# Patient Record
Sex: MALE | Race: WHITE | NOT HISPANIC OR LATINO | Employment: OTHER | URBAN - METROPOLITAN AREA
[De-identification: names, ages, dates, MRNs, and addresses within clinical notes are randomized per-mention and may not be internally consistent; named-entity substitution may affect disease eponyms.]

---

## 2017-03-27 ENCOUNTER — APPOINTMENT (EMERGENCY)
Dept: RADIOLOGY | Facility: HOSPITAL | Age: 37
End: 2017-03-27
Payer: COMMERCIAL

## 2017-03-27 ENCOUNTER — HOSPITAL ENCOUNTER (EMERGENCY)
Facility: HOSPITAL | Age: 37
Discharge: HOME | End: 2017-03-27
Attending: EMERGENCY MEDICINE | Admitting: EMERGENCY MEDICINE
Payer: COMMERCIAL

## 2017-03-27 VITALS
RESPIRATION RATE: 18 BRPM | SYSTOLIC BLOOD PRESSURE: 111 MMHG | WEIGHT: 260 LBS | HEART RATE: 67 BPM | TEMPERATURE: 98.2 F | OXYGEN SATURATION: 96 % | DIASTOLIC BLOOD PRESSURE: 64 MMHG

## 2017-03-27 DIAGNOSIS — M54.16 LUMBAR RADICULAR PAIN: Primary | ICD-10-CM

## 2017-03-27 LAB
ALBUMIN SERPL BCP-MCNC: 4.1 G/DL (ref 3.5–5)
ALP SERPL-CCNC: 77 U/L (ref 46–116)
ALT SERPL W P-5'-P-CCNC: 48 U/L (ref 12–78)
ANION GAP SERPL CALCULATED.3IONS-SCNC: 8 MMOL/L (ref 4–13)
AST SERPL W P-5'-P-CCNC: 26 U/L (ref 5–45)
BASOPHILS # BLD AUTO: 0.01 THOUSANDS/ΜL (ref 0–0.1)
BASOPHILS NFR BLD AUTO: 0 % (ref 0–1)
BILIRUB SERPL-MCNC: 0.5 MG/DL (ref 0.2–1)
BUN SERPL-MCNC: 23 MG/DL (ref 5–25)
CALCIUM SERPL-MCNC: 9.1 MG/DL (ref 8.3–10.1)
CHLORIDE SERPL-SCNC: 102 MMOL/L (ref 100–108)
CK MB SERPL-MCNC: 2 NG/ML (ref 0–5)
CK MB SERPL-MCNC: <1 % (ref 0–2.5)
CK SERPL-CCNC: 252 U/L (ref 39–308)
CLARITY, POC: CLEAR
CO2 SERPL-SCNC: 31 MMOL/L (ref 21–32)
COLOR, POC: NORMAL
CREAT SERPL-MCNC: 1.2 MG/DL (ref 0.6–1.3)
EOSINOPHIL # BLD AUTO: 0.08 THOUSAND/ΜL (ref 0–0.61)
EOSINOPHIL NFR BLD AUTO: 1 % (ref 0–6)
ERYTHROCYTE [DISTWIDTH] IN BLOOD BY AUTOMATED COUNT: 12.2 % (ref 11.6–15.1)
EXT BILIRUBIN, UA: NEGATIVE
EXT BLOOD URINE: NEGATIVE
EXT GLUCOSE, UA: NEGATIVE
EXT KETONES: NEGATIVE
EXT NITRITE, UA: NEGATIVE
EXT PH, UA: 6
EXT PROTEIN, UA: NORMAL
EXT SPECIFIC GRAVITY, UA: 1.02
EXT UROBILINOGEN: 0.2
GFR SERPL CREATININE-BSD FRML MDRD: >60 ML/MIN/1.73SQ M
GLUCOSE SERPL-MCNC: 101 MG/DL (ref 65–140)
HCT VFR BLD AUTO: 44.2 % (ref 36.5–49.3)
HGB BLD-MCNC: 15.1 G/DL (ref 12–17)
LYMPHOCYTES # BLD AUTO: 1.52 THOUSANDS/ΜL (ref 0.6–4.47)
LYMPHOCYTES NFR BLD AUTO: 26 % (ref 14–44)
MCH RBC QN AUTO: 29.7 PG (ref 26.8–34.3)
MCHC RBC AUTO-ENTMCNC: 34.2 G/DL (ref 31.4–37.4)
MCV RBC AUTO: 87 FL (ref 82–98)
MONOCYTES # BLD AUTO: 0.49 THOUSAND/ΜL (ref 0.17–1.22)
MONOCYTES NFR BLD AUTO: 8 % (ref 4–12)
NEUTROPHILS # BLD AUTO: 3.84 THOUSANDS/ΜL (ref 1.85–7.62)
NEUTS SEG NFR BLD AUTO: 65 % (ref 43–75)
PLATELET # BLD AUTO: 262 THOUSANDS/UL (ref 149–390)
PMV BLD AUTO: 11.1 FL (ref 8.9–12.7)
POTASSIUM SERPL-SCNC: 4.1 MMOL/L (ref 3.5–5.3)
PROT SERPL-MCNC: 7.9 G/DL (ref 6.4–8.2)
RBC # BLD AUTO: 5.08 MILLION/UL (ref 3.88–5.62)
SODIUM SERPL-SCNC: 141 MMOL/L (ref 136–145)
WBC # BLD AUTO: 5.94 THOUSAND/UL (ref 4.31–10.16)
WBC # BLD EST: NEGATIVE 10*3/UL

## 2017-03-27 PROCEDURE — 96361 HYDRATE IV INFUSION ADD-ON: CPT

## 2017-03-27 PROCEDURE — 99284 EMERGENCY DEPT VISIT MOD MDM: CPT

## 2017-03-27 PROCEDURE — 85025 COMPLETE CBC W/AUTO DIFF WBC: CPT | Performed by: PHYSICIAN ASSISTANT

## 2017-03-27 PROCEDURE — 80053 COMPREHEN METABOLIC PANEL: CPT | Performed by: PHYSICIAN ASSISTANT

## 2017-03-27 PROCEDURE — 96374 THER/PROPH/DIAG INJ IV PUSH: CPT

## 2017-03-27 PROCEDURE — 36415 COLL VENOUS BLD VENIPUNCTURE: CPT | Performed by: PHYSICIAN ASSISTANT

## 2017-03-27 PROCEDURE — 82553 CREATINE MB FRACTION: CPT | Performed by: PHYSICIAN ASSISTANT

## 2017-03-27 PROCEDURE — 96375 TX/PRO/DX INJ NEW DRUG ADDON: CPT

## 2017-03-27 PROCEDURE — 73552 X-RAY EXAM OF FEMUR 2/>: CPT

## 2017-03-27 PROCEDURE — 81002 URINALYSIS NONAUTO W/O SCOPE: CPT | Performed by: PHYSICIAN ASSISTANT

## 2017-03-27 PROCEDURE — 82550 ASSAY OF CK (CPK): CPT | Performed by: PHYSICIAN ASSISTANT

## 2017-03-27 PROCEDURE — 73502 X-RAY EXAM HIP UNI 2-3 VIEWS: CPT

## 2017-03-27 RX ORDER — OXYCODONE HYDROCHLORIDE AND ACETAMINOPHEN 5; 325 MG/1; MG/1
2 TABLET ORAL ONCE
Status: COMPLETED | OUTPATIENT
Start: 2017-03-27 | End: 2017-03-27

## 2017-03-27 RX ORDER — PREDNISONE 20 MG/1
40 TABLET ORAL DAILY
Qty: 8 TABLET | Refills: 0 | Status: SHIPPED | OUTPATIENT
Start: 2017-03-27 | End: 2017-04-26

## 2017-03-27 RX ORDER — CYCLOBENZAPRINE HCL 5 MG
5 TABLET ORAL 3 TIMES DAILY PRN
Qty: 12 TABLET | Refills: 0 | Status: SHIPPED | OUTPATIENT
Start: 2017-03-27 | End: 2018-12-17

## 2017-03-27 RX ORDER — OXYCODONE HYDROCHLORIDE AND ACETAMINOPHEN 5; 325 MG/1; MG/1
1 TABLET ORAL EVERY 6 HOURS PRN
Qty: 12 TABLET | Refills: 0 | Status: SHIPPED | OUTPATIENT
Start: 2017-03-27 | End: 2017-04-06

## 2017-03-27 RX ORDER — DIAZEPAM 5 MG/ML
5 INJECTION, SOLUTION INTRAMUSCULAR; INTRAVENOUS ONCE
Status: COMPLETED | OUTPATIENT
Start: 2017-03-27 | End: 2017-03-27

## 2017-03-27 RX ORDER — KETOROLAC TROMETHAMINE 30 MG/ML
30 INJECTION, SOLUTION INTRAMUSCULAR; INTRAVENOUS ONCE
Status: COMPLETED | OUTPATIENT
Start: 2017-03-27 | End: 2017-03-27

## 2017-03-27 RX ORDER — PREDNISONE 20 MG/1
60 TABLET ORAL ONCE
Status: COMPLETED | OUTPATIENT
Start: 2017-03-27 | End: 2017-03-27

## 2017-03-27 RX ADMIN — PREDNISONE 60 MG: 20 TABLET ORAL at 10:57

## 2017-03-27 RX ADMIN — DIAZEPAM 5 MG: 5 INJECTION, SOLUTION INTRAMUSCULAR; INTRAVENOUS at 08:53

## 2017-03-27 RX ADMIN — SODIUM CHLORIDE 1000 ML: 0.9 INJECTION, SOLUTION INTRAVENOUS at 08:54

## 2017-03-27 RX ADMIN — OXYCODONE HYDROCHLORIDE AND ACETAMINOPHEN 2 TABLET: 5; 325 TABLET ORAL at 10:57

## 2017-03-27 RX ADMIN — KETOROLAC TROMETHAMINE 30 MG: 30 INJECTION, SOLUTION INTRAMUSCULAR at 08:54

## 2017-10-04 ENCOUNTER — TRANSCRIBE ORDERS (OUTPATIENT)
Dept: ADMINISTRATIVE | Facility: HOSPITAL | Age: 37
End: 2017-10-04

## 2017-10-04 ENCOUNTER — HOSPITAL ENCOUNTER (OUTPATIENT)
Dept: RADIOLOGY | Facility: HOSPITAL | Age: 37
Discharge: HOME | End: 2017-10-04
Attending: PODIATRIST
Payer: COMMERCIAL

## 2017-10-04 DIAGNOSIS — M79.672 LEFT FOOT PAIN: ICD-10-CM

## 2017-10-04 DIAGNOSIS — M72.2 PLANTAR FASCIITIS: ICD-10-CM

## 2017-10-04 DIAGNOSIS — M72.2 PLANTAR FASCIITIS: Primary | ICD-10-CM

## 2017-10-04 DIAGNOSIS — M79.671 RIGHT FOOT PAIN: ICD-10-CM

## 2017-10-04 PROCEDURE — 73630 X-RAY EXAM OF FOOT: CPT

## 2017-10-04 PROCEDURE — 73610 X-RAY EXAM OF ANKLE: CPT

## 2018-11-20 ENCOUNTER — DOCUMENTATION (OUTPATIENT)
Dept: OTHER | Facility: HOSPITAL | Age: 38
End: 2018-11-20

## 2018-11-20 DIAGNOSIS — M72.2 PLANTAR FASCIAL FIBROMATOSIS: ICD-10-CM

## 2018-11-20 DIAGNOSIS — M76.822 POSTERIOR TIBIAL TENDONITIS, LEFT: ICD-10-CM

## 2018-11-20 DIAGNOSIS — S90.32XA CONTUSION OF LEFT FOOT, INITIAL ENCOUNTER: Primary | ICD-10-CM

## 2018-11-29 ENCOUNTER — HOSPITAL ENCOUNTER (OUTPATIENT)
Dept: RADIOLOGY | Age: 38
Discharge: HOME | End: 2018-11-29
Payer: COMMERCIAL

## 2018-11-29 DIAGNOSIS — M76.822 POSTERIOR TIBIAL TENDONITIS, LEFT: ICD-10-CM

## 2018-11-29 DIAGNOSIS — S90.32XA CONTUSION OF LEFT FOOT, INITIAL ENCOUNTER: ICD-10-CM

## 2018-11-29 DIAGNOSIS — M72.2 PLANTAR FASCIAL FIBROMATOSIS: ICD-10-CM

## 2018-11-29 PROCEDURE — 73721 MRI JNT OF LWR EXTRE W/O DYE: CPT

## 2018-12-17 RX ORDER — IBUPROFEN 600 MG/1
TABLET ORAL EVERY 6 HOURS PRN
COMMUNITY

## 2018-12-17 NOTE — PRE-PROCEDURE INSTRUCTIONS
Pre-Surgery Instructions:   Medication Instructions    ibuprofen (MOTRIN) 600 mg tablet Instructed patient per Anesthesia Guidelines  Per anesthesia patient/wife were instructed to stop NSAIDS and supplements one week preop unless surgeon gives other instructions  No medications are needed on day of surgery  Instructed on use of Chlorhexidine for preoperative bathing

## 2018-12-24 ENCOUNTER — ANESTHESIA EVENT (OUTPATIENT)
Dept: PERIOP | Facility: HOSPITAL | Age: 38
End: 2018-12-24
Payer: COMMERCIAL

## 2018-12-26 ENCOUNTER — HOSPITAL ENCOUNTER (OUTPATIENT)
Facility: HOSPITAL | Age: 38
Setting detail: OUTPATIENT SURGERY
Discharge: HOME | End: 2018-12-26
Attending: PODIATRIST | Admitting: PODIATRIST
Payer: COMMERCIAL

## 2018-12-26 ENCOUNTER — ANESTHESIA (OUTPATIENT)
Dept: PERIOP | Facility: HOSPITAL | Age: 38
End: 2018-12-26
Payer: COMMERCIAL

## 2018-12-26 VITALS
RESPIRATION RATE: 16 BRPM | HEART RATE: 72 BPM | WEIGHT: 260 LBS | TEMPERATURE: 97.6 F | OXYGEN SATURATION: 96 % | HEIGHT: 74 IN | BODY MASS INDEX: 33.37 KG/M2 | DIASTOLIC BLOOD PRESSURE: 62 MMHG | SYSTOLIC BLOOD PRESSURE: 125 MMHG

## 2018-12-26 DIAGNOSIS — Z47.89 SURGICAL AFTERCARE, MUSCULOSKELETAL SYSTEM: Primary | ICD-10-CM

## 2018-12-26 RX ORDER — OXYCODONE HYDROCHLORIDE AND ACETAMINOPHEN 5; 325 MG/1; MG/1
1 TABLET ORAL EVERY 4 HOURS PRN
Status: DISCONTINUED | OUTPATIENT
Start: 2018-12-26 | End: 2018-12-26 | Stop reason: HOSPADM

## 2018-12-26 RX ORDER — FENTANYL CITRATE 50 UG/ML
50 INJECTION, SOLUTION INTRAMUSCULAR; INTRAVENOUS
Status: DISCONTINUED | OUTPATIENT
Start: 2018-12-26 | End: 2018-12-26 | Stop reason: HOSPADM

## 2018-12-26 RX ORDER — CEFAZOLIN SODIUM 2 G/50ML
2000 SOLUTION INTRAVENOUS
Status: DISCONTINUED | OUTPATIENT
Start: 2018-12-26 | End: 2018-12-26 | Stop reason: HOSPADM

## 2018-12-26 RX ORDER — SODIUM CHLORIDE 9 MG/ML
125 INJECTION, SOLUTION INTRAVENOUS CONTINUOUS
Status: DISCONTINUED | OUTPATIENT
Start: 2018-12-26 | End: 2018-12-26 | Stop reason: HOSPADM

## 2018-12-26 RX ORDER — MIDAZOLAM HYDROCHLORIDE 1 MG/ML
INJECTION INTRAMUSCULAR; INTRAVENOUS AS NEEDED
Status: DISCONTINUED | OUTPATIENT
Start: 2018-12-26 | End: 2018-12-26 | Stop reason: SURG

## 2018-12-26 RX ORDER — ONDANSETRON 2 MG/ML
INJECTION INTRAMUSCULAR; INTRAVENOUS AS NEEDED
Status: DISCONTINUED | OUTPATIENT
Start: 2018-12-26 | End: 2018-12-26 | Stop reason: SURG

## 2018-12-26 RX ORDER — OXYCODONE HYDROCHLORIDE AND ACETAMINOPHEN 5; 325 MG/1; MG/1
1 TABLET ORAL EVERY 4 HOURS PRN
Qty: 15 TABLET | Refills: 0 | Status: SHIPPED | OUTPATIENT
Start: 2018-12-26 | End: 2019-01-05

## 2018-12-26 RX ORDER — PROPOFOL 10 MG/ML
INJECTION, EMULSION INTRAVENOUS AS NEEDED
Status: DISCONTINUED | OUTPATIENT
Start: 2018-12-26 | End: 2018-12-26 | Stop reason: SURG

## 2018-12-26 RX ORDER — BETAMETHASONE SODIUM PHOSPHATE AND BETAMETHASONE ACETATE 3; 3 MG/ML; MG/ML
INJECTION, SUSPENSION INTRA-ARTICULAR; INTRALESIONAL; INTRAMUSCULAR; SOFT TISSUE AS NEEDED
Status: DISCONTINUED | OUTPATIENT
Start: 2018-12-26 | End: 2018-12-26 | Stop reason: HOSPADM

## 2018-12-26 RX ORDER — LIDOCAINE HYDROCHLORIDE 10 MG/ML
INJECTION, SOLUTION INFILTRATION; PERINEURAL AS NEEDED
Status: DISCONTINUED | OUTPATIENT
Start: 2018-12-26 | End: 2018-12-26 | Stop reason: SURG

## 2018-12-26 RX ORDER — ACETAMINOPHEN 500 MG
1000 TABLET ORAL EVERY 6 HOURS PRN
COMMUNITY

## 2018-12-26 RX ORDER — ONDANSETRON 2 MG/ML
4 INJECTION INTRAMUSCULAR; INTRAVENOUS EVERY 6 HOURS PRN
Status: DISCONTINUED | OUTPATIENT
Start: 2018-12-26 | End: 2018-12-26 | Stop reason: HOSPADM

## 2018-12-26 RX ORDER — MAGNESIUM HYDROXIDE 1200 MG/15ML
LIQUID ORAL AS NEEDED
Status: DISCONTINUED | OUTPATIENT
Start: 2018-12-26 | End: 2018-12-26 | Stop reason: HOSPADM

## 2018-12-26 RX ORDER — KETOROLAC TROMETHAMINE 30 MG/ML
INJECTION, SOLUTION INTRAMUSCULAR; INTRAVENOUS AS NEEDED
Status: DISCONTINUED | OUTPATIENT
Start: 2018-12-26 | End: 2018-12-26 | Stop reason: SURG

## 2018-12-26 RX ORDER — FENTANYL CITRATE 50 UG/ML
INJECTION, SOLUTION INTRAMUSCULAR; INTRAVENOUS AS NEEDED
Status: DISCONTINUED | OUTPATIENT
Start: 2018-12-26 | End: 2018-12-26 | Stop reason: SURG

## 2018-12-26 RX ADMIN — CEFAZOLIN SODIUM 2000 MG: 2 SOLUTION INTRAVENOUS at 08:44

## 2018-12-26 RX ADMIN — ONDANSETRON 4 MG: 2 INJECTION INTRAMUSCULAR; INTRAVENOUS at 08:58

## 2018-12-26 RX ADMIN — KETOROLAC TROMETHAMINE 30 MG: 30 INJECTION, SOLUTION INTRAMUSCULAR at 09:16

## 2018-12-26 RX ADMIN — SODIUM CHLORIDE 125 ML/HR: 0.9 INJECTION, SOLUTION INTRAVENOUS at 07:53

## 2018-12-26 RX ADMIN — DEXAMETHASONE SODIUM PHOSPHATE 4 MG: 10 INJECTION INTRAMUSCULAR; INTRAVENOUS at 08:58

## 2018-12-26 RX ADMIN — MIDAZOLAM 2 MG: 1 INJECTION INTRAMUSCULAR; INTRAVENOUS at 08:27

## 2018-12-26 RX ADMIN — PROPOFOL 200 MG: 10 INJECTION, EMULSION INTRAVENOUS at 08:40

## 2018-12-26 RX ADMIN — LIDOCAINE HYDROCHLORIDE 80 MG: 10 INJECTION, SOLUTION INFILTRATION; PERINEURAL at 08:40

## 2018-12-26 RX ADMIN — SODIUM CHLORIDE: 0.9 INJECTION, SOLUTION INTRAVENOUS at 09:16

## 2018-12-26 RX ADMIN — FENTANYL CITRATE 100 MCG: 50 INJECTION, SOLUTION INTRAMUSCULAR; INTRAVENOUS at 08:27

## 2018-12-26 NOTE — ANESTHESIA PREPROCEDURE EVALUATION
Review of Systems/Medical History  Patient summary reviewed  Chart reviewed  No history of anesthetic complications     Cardiovascular  Negative cardio ROS    Pulmonary    Comment: snores     GI/Hepatic  Negative GI/hepatic ROS          Negative  ROS        Endo/Other    Obesity    GYN  Negative gynecology ROS          Hematology  Negative hematology ROS      Musculoskeletal  Negative musculoskeletal ROS Back pain (6 herniated discd) ,   Arthritis     Neurology  Negative neurology ROS      Psychology   Negative psychology ROS              Physical Exam    Airway    Mallampati score: II  TM Distance: >3 FB  Neck ROM: full     Dental       Cardiovascular  Comment: Negative ROS, Rhythm: regular, Rate: normal, Cardiovascular exam normal    Pulmonary  Pulmonary exam normal     Other Findings        Anesthesia Plan  ASA Score- 2     Anesthesia Type- IV sedation with anesthesia with ASA Monitors  Additional Monitors:   Airway Plan:         Plan Factors-Patient not instructed to abstain from smoking on day of procedure  Patient did not smoke on day of surgery  Induction- intravenous  Postoperative Plan-     Informed Consent- Anesthetic plan and risks discussed with patient

## 2018-12-26 NOTE — OP NOTE
OPERATIVE REPORT  PATIENT NAME: Bia Rivera    :  1980  MRN: [id number]  Pt Location: [facility] OR ROOM 04    SURGERY DATE: 2018    Surgeon(s) and Role:     * Ilir Brown DPM - Primary     * Bronson Lagunas DPM - Assisting    Preop Diagnosis:  Plantar fascial fibromatosis [M72 2]    Post-Op Diagnosis Codes:     * Plantar fascial fibromatosis [M72 2]    Procedure(s) (LRB):  RELEASE FASCIA PLANTAR/FASCIOTOMY ENDOSCOPIC (EPF) (Left)    Specimen(s):  * No specimens in log *    Estimated Blood Loss:   Minimal    Drains:   none    Anesthesia Type:   Choice with 20 cc of 1 1 mix of 1% lidocaine plain 0 5% Marcaine plain    Injectables:  15 cc of 1 1 mix of 0 5% Marcaine with epi and 1% lidocaine plain, 1 cc Celestone    Hemostasis:  Ankle tourniquet at 250 mm of mercury for 17 min    Operative Indications:  Plantar fascial fibromatosis [M72 2]    Operative Findings:  Adequate release of medial band plantar fascia, lateral band left intact    Complications:   None    Procedure and Technique:  Under mild sedation, the patient was brought into the operating room and placed on the operating room table in the supine position  A pneumatic ankle tourniquet was then placed around the patient's left ankle with ample webril padding  A time out was performed to confirm the correct patient, procedure and site with all parties in agreement  Following IV sedation, local anesthetic was obtained about the patient's left ankle was performed consisting of 20 ml of 1% Lidocaine and 0 5% Bupivacaine in a 1:1 mixture  The foot was then scrubbed, prepped and draped in the usual aseptic manner  An esmarch bandage was utilized to exsangunate the patients foot and the pneumatic ankle tourniquet was then inflated  The esmarch bandage was removed and the foot was placed on the operating room table  Attention was directed to the medial Left heel   A 15 blade was used to create a small stab incision approximately 3 inches anterior to the heel and 2 inches dorsal to the plantar heel  A hemostat was used to continue blunt dissection until the plantar fascia level was reached  The plantar fascia was strummed with a hemostat  Then an elevator was inserted superficial to the plantar fascia and passed along to the lateral side of the foot  This was removed  Then the obturator and cannula as one full unit were inserted in the same plane of tissue  It was passed along superficial to the plantar fascia and out towards the lateral skin edge  At this location a 15 blade was used to make a stab incision for the lateral skin portal  The obturator cannula was passed through the lateral portal  The outrigger was removed  Several cotton swabs were passed through the cannula to remove any loose fatty debris  Then a 2 7 mm camera was inserted into the lateral portal to observe the medial fascia  A blunt probe was used to help visualize the fascia  Then a triangular blade was used to release the fascia from a lateral to medial direction, the one third medial band of the plantar fascia was released well applying dorsiflexion force to the foot  After doing so the underlying muscle belly was visualized  The camera was removed and the site was flushed with saline  The obturator was placed back into the cannula and it was removed in its one full unit  A postoperative injection consisting of 15 ml of 1:1 mix 0 5% Marcaine with epi and 1% lidocaine plain, and 1 cc of Celestone was performed  The incision site was then dressed with Xeroform, Dry sterile dressing, aACE  The pneumatic ankle tourniquet was deflated and normal hyperemic flush was noted to all digits  The patient tolerated the procedure and anesthesia well and was transported to the PACU with vital signs stable       Patient Disposition:  hemodynamically stable    SIGNATURE: Ania Szymanski DPM  DATE: December 26, 2018  TIME: 9:27 AM

## 2018-12-26 NOTE — DISCHARGE INSTRUCTIONS
Post-Operative Instructions    1  Take your prescribed medication as directed  2  Upon arrival at home, lie down and elevate your surgical foot on 2 pillows  3  Remain quiet, off your feet as much as possible, for the first 24-48 hours  This is when your feet first swell and may become painful  After 48 hours you may begin limited walking following these restrictions:   Weightbear as tolerated to surgical foot  4  Drink large quantities of water  Consume no alcohol  Continue a well-balanced diet  5  Report any unusual discomfort or fever to this office  6  A limited amount of discomfort and swelling is to be expected  In some cases the skin may take on a bruised appearance  The surgical solution that was applied to your foot prior to the operation is dark in color and the operation site may appear to be oozing when it actually is not  7  A slight amount of blood is to be expected, and is no cause for alarm  Do not remove the dressings  If there is active bleeding and if the bleeding persists, add additional gauze to the bandage, apply direct pressure, elevate your feet and call this office  8  Do not get the dressings wet  As regular bathing may be inconvenient, sponge baths are recommended  9  When anesthesia wears off and if any discomfort should be present, apply an ice pack directly over the operated area for 15 minute intervals for several hours or until the pain leaves  (USE IN EXCESS OF 15 MINUTES COULD CAUSE FROSTBITE)  Do not use hot water bags or electric pads  A convenient icepack can be made by placing ice cubes in a plastic bag and covering this with a towel  10  If necessary, take a mild laxative before retiring  11  Wear your special open shoes anytime you put weight on your foot, even if it is just to walk to the bathroom and back  It will probably be 2 or 3 weeks before you will be permitted to try regular shoes    12  Having performed the operation, we are interested in a prompt recovery  Please cooperate by following the above instructions  13  Please call to confirm your post-op appointment or call with any other questions

## 2019-07-28 ENCOUNTER — APPOINTMENT (EMERGENCY)
Dept: RADIOLOGY | Facility: HOSPITAL | Age: 39
DRG: 867 | End: 2019-07-28
Payer: COMMERCIAL

## 2019-07-28 ENCOUNTER — HOSPITAL ENCOUNTER (INPATIENT)
Facility: HOSPITAL | Age: 39
LOS: 1 days | DRG: 867 | End: 2019-07-29
Attending: EMERGENCY MEDICINE | Admitting: INTERNAL MEDICINE
Payer: COMMERCIAL

## 2019-07-28 ENCOUNTER — APPOINTMENT (EMERGENCY)
Dept: NON INVASIVE DIAGNOSTICS | Facility: HOSPITAL | Age: 39
DRG: 867 | End: 2019-07-28
Attending: EMERGENCY MEDICINE
Payer: COMMERCIAL

## 2019-07-28 DIAGNOSIS — R94.31 ST ELEVATION: ICD-10-CM

## 2019-07-28 DIAGNOSIS — I44.2 COMPLETE HEART BLOCK (HCC): Primary | ICD-10-CM

## 2019-07-28 DIAGNOSIS — R77.8 ELEVATED TROPONIN I LEVEL: ICD-10-CM

## 2019-07-28 PROBLEM — N17.9 AKI (ACUTE KIDNEY INJURY) (HCC): Status: ACTIVE | Noted: 2019-07-28

## 2019-07-28 LAB
ANION GAP BLD CALC-SCNC: 18 MMOL/L (ref 4–13)
ANION GAP SERPL CALCULATED.3IONS-SCNC: 11 MMOL/L (ref 4–13)
APTT PPP: 37 SECONDS (ref 23–37)
ATRIAL RATE: 43 BPM
ATRIAL RATE: 43 BPM
ATRIAL RATE: 47 BPM
ATRIAL RATE: 47 BPM
BASOPHILS # BLD AUTO: 0.04 THOUSANDS/ΜL (ref 0–0.1)
BASOPHILS NFR BLD AUTO: 1 % (ref 0–1)
BUN BLD-MCNC: 20 MG/DL (ref 5–25)
BUN SERPL-MCNC: 21 MG/DL (ref 5–25)
CA-I BLD-SCNC: 1.04 MMOL/L (ref 1.12–1.32)
CALCIUM SERPL-MCNC: 8.4 MG/DL (ref 8.3–10.1)
CHLORIDE BLD-SCNC: 104 MMOL/L (ref 100–108)
CHLORIDE SERPL-SCNC: 103 MMOL/L (ref 100–108)
CHOLEST SERPL-MCNC: 143 MG/DL (ref 50–200)
CO2 SERPL-SCNC: 24 MMOL/L (ref 21–32)
CREAT BLD-MCNC: 1.4 MG/DL (ref 0.6–1.3)
CREAT SERPL-MCNC: 1.4 MG/DL (ref 0.6–1.3)
EOSINOPHIL # BLD AUTO: 0.02 THOUSAND/ΜL (ref 0–0.61)
EOSINOPHIL NFR BLD AUTO: 0 % (ref 0–6)
ERYTHROCYTE [DISTWIDTH] IN BLOOD BY AUTOMATED COUNT: 11.8 % (ref 11.6–15.1)
GFR SERPL CREATININE-BSD FRML MDRD: 62 ML/MIN/1.73SQ M
GFR SERPL CREATININE-BSD FRML MDRD: 62 ML/MIN/1.73SQ M
GLUCOSE SERPL-MCNC: 141 MG/DL (ref 65–140)
GLUCOSE SERPL-MCNC: 148 MG/DL (ref 65–140)
HCT VFR BLD AUTO: 38.9 % (ref 36.5–49.3)
HCT VFR BLD CALC: 37 % (ref 36.5–49.3)
HDLC SERPL-MCNC: 33 MG/DL (ref 40–60)
HGB BLD-MCNC: 12.8 G/DL (ref 12–17)
HGB BLDA-MCNC: 12.6 G/DL (ref 12–17)
IMM GRANULOCYTES # BLD AUTO: 0.02 THOUSAND/UL (ref 0–0.2)
IMM GRANULOCYTES NFR BLD AUTO: 0 % (ref 0–2)
INR PPP: 1.17 (ref 0.84–1.19)
LDLC SERPL CALC-MCNC: 91 MG/DL (ref 0–100)
LYMPHOCYTES # BLD AUTO: 1.42 THOUSANDS/ΜL (ref 0.6–4.47)
LYMPHOCYTES NFR BLD AUTO: 19 % (ref 14–44)
MAGNESIUM SERPL-MCNC: 1.9 MG/DL (ref 1.6–2.6)
MCH RBC QN AUTO: 29.8 PG (ref 26.8–34.3)
MCHC RBC AUTO-ENTMCNC: 32.9 G/DL (ref 31.4–37.4)
MCV RBC AUTO: 91 FL (ref 82–98)
MONOCYTES # BLD AUTO: 0.59 THOUSAND/ΜL (ref 0.17–1.22)
MONOCYTES NFR BLD AUTO: 8 % (ref 4–12)
NEUTROPHILS # BLD AUTO: 5.59 THOUSANDS/ΜL (ref 1.85–7.62)
NEUTS SEG NFR BLD AUTO: 72 % (ref 43–75)
NONHDLC SERPL-MCNC: 110 MG/DL
NRBC BLD AUTO-RTO: 0 /100 WBCS
P AXIS: 53 DEGREES
P AXIS: 54 DEGREES
P AXIS: 58 DEGREES
P AXIS: 63 DEGREES
PCO2 BLD: 21 MMOL/L (ref 21–32)
PLATELET # BLD AUTO: 355 THOUSANDS/UL (ref 149–390)
PMV BLD AUTO: 11 FL (ref 8.9–12.7)
POTASSIUM BLD-SCNC: 4.1 MMOL/L (ref 3.5–5.3)
POTASSIUM SERPL-SCNC: 4.1 MMOL/L (ref 3.5–5.3)
PROTHROMBIN TIME: 14.3 SECONDS (ref 11.6–14.5)
QRS AXIS: -10 DEGREES
QRS AXIS: -30 DEGREES
QRSD INTERVAL: 104 MS
QRSD INTERVAL: 150 MS
QRSD INTERVAL: 156 MS
QRSD INTERVAL: 160 MS
QT INTERVAL: 516 MS
QT INTERVAL: 520 MS
QT INTERVAL: 522 MS
QT INTERVAL: 560 MS
QTC INTERVAL: 435 MS
QTC INTERVAL: 447 MS
QTC INTERVAL: 452 MS
QTC INTERVAL: 463 MS
RBC # BLD AUTO: 4.29 MILLION/UL (ref 3.88–5.62)
SODIUM BLD-SCNC: 138 MMOL/L (ref 136–145)
SODIUM SERPL-SCNC: 138 MMOL/L (ref 136–145)
SPECIMEN SOURCE: ABNORMAL
T WAVE AXIS: 24 DEGREES
T WAVE AXIS: 26 DEGREES
T WAVE AXIS: 28 DEGREES
T WAVE AXIS: 45 DEGREES
TRIGL SERPL-MCNC: 93 MG/DL
TROPONIN I SERPL-MCNC: 0.1 NG/ML
TSH 30M P TRH SERPL-ACNC: 1.95 UIU/ML
TSH SERPL DL<=0.05 MIU/L-ACNC: 2.06 UIU/ML
TSH SERPL DL<=0.05 MIU/L-ACNC: 2.45 UIU/ML (ref 0.36–3.74)
VENTRICULAR RATE: 41 BPM
VENTRICULAR RATE: 42 BPM
VENTRICULAR RATE: 45 BPM
VENTRICULAR RATE: 45 BPM
WBC # BLD AUTO: 7.68 THOUSAND/UL (ref 4.31–10.16)

## 2019-07-28 PROCEDURE — 80061 LIPID PANEL: CPT | Performed by: EMERGENCY MEDICINE

## 2019-07-28 PROCEDURE — 83735 ASSAY OF MAGNESIUM: CPT | Performed by: EMERGENCY MEDICINE

## 2019-07-28 PROCEDURE — NC001 PR NO CHARGE: Performed by: INTERNAL MEDICINE

## 2019-07-28 PROCEDURE — 86617 LYME DISEASE ANTIBODY: CPT | Performed by: INTERNAL MEDICINE

## 2019-07-28 PROCEDURE — 93005 ELECTROCARDIOGRAM TRACING: CPT

## 2019-07-28 PROCEDURE — 4A023N7 MEASUREMENT OF CARDIAC SAMPLING AND PRESSURE, LEFT HEART, PERCUTANEOUS APPROACH: ICD-10-PCS | Performed by: EMERGENCY MEDICINE

## 2019-07-28 PROCEDURE — 96374 THER/PROPH/DIAG INJ IV PUSH: CPT

## 2019-07-28 PROCEDURE — 85014 HEMATOCRIT: CPT

## 2019-07-28 PROCEDURE — 84484 ASSAY OF TROPONIN QUANT: CPT | Performed by: EMERGENCY MEDICINE

## 2019-07-28 PROCEDURE — 99253 IP/OBS CNSLTJ NEW/EST LOW 45: CPT | Performed by: INTERNAL MEDICINE

## 2019-07-28 PROCEDURE — C1760 CLOSURE DEV, VASC: HCPCS | Performed by: EMERGENCY MEDICINE

## 2019-07-28 PROCEDURE — 5A1223Z PERFORMANCE OF CARDIAC PACING, CONTINUOUS: ICD-10-PCS | Performed by: EMERGENCY MEDICINE

## 2019-07-28 PROCEDURE — 93458 L HRT ARTERY/VENTRICLE ANGIO: CPT | Performed by: INTERNAL MEDICINE

## 2019-07-28 PROCEDURE — C1894 INTRO/SHEATH, NON-LASER: HCPCS | Performed by: EMERGENCY MEDICINE

## 2019-07-28 PROCEDURE — 85730 THROMBOPLASTIN TIME PARTIAL: CPT | Performed by: EMERGENCY MEDICINE

## 2019-07-28 PROCEDURE — 99285 EMERGENCY DEPT VISIT HI MDM: CPT

## 2019-07-28 PROCEDURE — 71045 X-RAY EXAM CHEST 1 VIEW: CPT

## 2019-07-28 PROCEDURE — 99152 MOD SED SAME PHYS/QHP 5/>YRS: CPT | Performed by: INTERNAL MEDICINE

## 2019-07-28 PROCEDURE — 99232 SBSQ HOSP IP/OBS MODERATE 35: CPT | Performed by: PHYSICIAN ASSISTANT

## 2019-07-28 PROCEDURE — 36415 COLL VENOUS BLD VENIPUNCTURE: CPT | Performed by: EMERGENCY MEDICINE

## 2019-07-28 PROCEDURE — 99291 CRITICAL CARE FIRST HOUR: CPT | Performed by: EMERGENCY MEDICINE

## 2019-07-28 PROCEDURE — C1887 CATHETER, GUIDING: HCPCS | Performed by: EMERGENCY MEDICINE

## 2019-07-28 PROCEDURE — 80047 BASIC METABLC PNL IONIZED CA: CPT

## 2019-07-28 PROCEDURE — 84443 ASSAY THYROID STIM HORMONE: CPT | Performed by: INTERNAL MEDICINE

## 2019-07-28 PROCEDURE — 33210 INSERT ELECTRD/PM CATH SNGL: CPT | Performed by: EMERGENCY MEDICINE

## 2019-07-28 PROCEDURE — 86618 LYME DISEASE ANTIBODY: CPT | Performed by: INTERNAL MEDICINE

## 2019-07-28 PROCEDURE — B2111ZZ FLUOROSCOPY OF MULTIPLE CORONARY ARTERIES USING LOW OSMOLAR CONTRAST: ICD-10-PCS | Performed by: EMERGENCY MEDICINE

## 2019-07-28 PROCEDURE — 93458 L HRT ARTERY/VENTRICLE ANGIO: CPT | Performed by: EMERGENCY MEDICINE

## 2019-07-28 PROCEDURE — B2151ZZ FLUOROSCOPY OF LEFT HEART USING LOW OSMOLAR CONTRAST: ICD-10-PCS | Performed by: EMERGENCY MEDICINE

## 2019-07-28 PROCEDURE — 96375 TX/PRO/DX INJ NEW DRUG ADDON: CPT

## 2019-07-28 PROCEDURE — 80048 BASIC METABOLIC PNL TOTAL CA: CPT | Performed by: EMERGENCY MEDICINE

## 2019-07-28 PROCEDURE — 84443 ASSAY THYROID STIM HORMONE: CPT | Performed by: PHYSICIAN ASSISTANT

## 2019-07-28 PROCEDURE — 93010 ELECTROCARDIOGRAM REPORT: CPT | Performed by: INTERNAL MEDICINE

## 2019-07-28 PROCEDURE — 99152 MOD SED SAME PHYS/QHP 5/>YRS: CPT | Performed by: EMERGENCY MEDICINE

## 2019-07-28 PROCEDURE — 99153 MOD SED SAME PHYS/QHP EA: CPT | Performed by: EMERGENCY MEDICINE

## 2019-07-28 PROCEDURE — 85610 PROTHROMBIN TIME: CPT | Performed by: EMERGENCY MEDICINE

## 2019-07-28 PROCEDURE — 85025 COMPLETE CBC W/AUTO DIFF WBC: CPT | Performed by: EMERGENCY MEDICINE

## 2019-07-28 RX ORDER — LANOLIN ALCOHOL/MO/W.PET/CERES
3 CREAM (GRAM) TOPICAL
Status: DISCONTINUED | OUTPATIENT
Start: 2019-07-28 | End: 2019-07-29 | Stop reason: HOSPADM

## 2019-07-28 RX ORDER — ACETAMINOPHEN 325 MG/1
650 TABLET ORAL EVERY 4 HOURS PRN
Status: DISCONTINUED | OUTPATIENT
Start: 2019-07-28 | End: 2019-07-28

## 2019-07-28 RX ORDER — ACETAMINOPHEN 500 MG
1000 TABLET ORAL EVERY 8 HOURS PRN
Status: DISCONTINUED | OUTPATIENT
Start: 2019-07-28 | End: 2019-07-28

## 2019-07-28 RX ORDER — LIDOCAINE HYDROCHLORIDE 10 MG/ML
INJECTION, SOLUTION INFILTRATION; PERINEURAL CODE/TRAUMA/SEDATION MEDICATION
Status: COMPLETED | OUTPATIENT
Start: 2019-07-28 | End: 2019-07-28

## 2019-07-28 RX ORDER — HEPARIN SODIUM 1000 [USP'U]/ML
4000 INJECTION, SOLUTION INTRAVENOUS; SUBCUTANEOUS ONCE
Status: COMPLETED | OUTPATIENT
Start: 2019-07-28 | End: 2019-07-28

## 2019-07-28 RX ORDER — MIDAZOLAM HYDROCHLORIDE 1 MG/ML
INJECTION INTRAMUSCULAR; INTRAVENOUS CODE/TRAUMA/SEDATION MEDICATION
Status: COMPLETED | OUTPATIENT
Start: 2019-07-28 | End: 2019-07-28

## 2019-07-28 RX ORDER — SODIUM CHLORIDE 9 MG/ML
100 INJECTION, SOLUTION INTRAVENOUS CONTINUOUS
Status: DISCONTINUED | OUTPATIENT
Start: 2019-07-28 | End: 2019-07-29

## 2019-07-28 RX ORDER — LORAZEPAM 2 MG/ML
1 INJECTION INTRAMUSCULAR ONCE
Status: COMPLETED | OUTPATIENT
Start: 2019-07-28 | End: 2019-07-28

## 2019-07-28 RX ORDER — SODIUM CHLORIDE 9 MG/ML
INJECTION, SOLUTION INTRAVENOUS
Status: COMPLETED | OUTPATIENT
Start: 2019-07-28 | End: 2019-07-28

## 2019-07-28 RX ORDER — ACETAMINOPHEN 325 MG/1
975 TABLET ORAL ONCE
Status: COMPLETED | OUTPATIENT
Start: 2019-07-28 | End: 2019-07-28

## 2019-07-28 RX ORDER — FENTANYL CITRATE 50 UG/ML
INJECTION, SOLUTION INTRAMUSCULAR; INTRAVENOUS CODE/TRAUMA/SEDATION MEDICATION
Status: COMPLETED | OUTPATIENT
Start: 2019-07-28 | End: 2019-07-28

## 2019-07-28 RX ORDER — 0.9 % SODIUM CHLORIDE 0.9 %
3 VIAL (ML) INJECTION AS NEEDED
Status: DISCONTINUED | OUTPATIENT
Start: 2019-07-28 | End: 2019-07-29 | Stop reason: HOSPADM

## 2019-07-28 RX ORDER — ASPIRIN 81 MG/1
324 TABLET, CHEWABLE ORAL ONCE
Status: COMPLETED | OUTPATIENT
Start: 2019-07-28 | End: 2019-07-28

## 2019-07-28 RX ORDER — OXYCODONE HYDROCHLORIDE 5 MG/1
5 TABLET ORAL EVERY 4 HOURS PRN
Status: DISCONTINUED | OUTPATIENT
Start: 2019-07-28 | End: 2019-07-29 | Stop reason: HOSPADM

## 2019-07-28 RX ORDER — ACETAMINOPHEN 325 MG/1
650 TABLET ORAL EVERY 4 HOURS PRN
Status: DISCONTINUED | OUTPATIENT
Start: 2019-07-28 | End: 2019-07-29 | Stop reason: HOSPADM

## 2019-07-28 RX ORDER — SODIUM CHLORIDE 9 MG/ML
100 INJECTION, SOLUTION INTRAVENOUS CONTINUOUS
Status: DISCONTINUED | OUTPATIENT
Start: 2019-07-28 | End: 2019-07-28

## 2019-07-28 RX ADMIN — ASPIRIN 81 MG 324 MG: 81 TABLET ORAL at 12:12

## 2019-07-28 RX ADMIN — ACETAMINOPHEN 975 MG: 325 TABLET, FILM COATED ORAL at 19:52

## 2019-07-28 RX ADMIN — TICAGRELOR 180 MG: 90 TABLET ORAL at 12:14

## 2019-07-28 RX ADMIN — MIDAZOLAM HYDROCHLORIDE 2 MG: 1 INJECTION, SOLUTION INTRAMUSCULAR; INTRAVENOUS at 12:51

## 2019-07-28 RX ADMIN — MELATONIN 3 MG: at 22:16

## 2019-07-28 RX ADMIN — MIDAZOLAM HYDROCHLORIDE 1 MG: 1 INJECTION, SOLUTION INTRAMUSCULAR; INTRAVENOUS at 13:28

## 2019-07-28 RX ADMIN — IOHEXOL 133 ML: 350 INJECTION, SOLUTION INTRAVENOUS at 13:29

## 2019-07-28 RX ADMIN — CEFTRIAXONE SODIUM 2000 MG: 10 INJECTION, POWDER, FOR SOLUTION INTRAVENOUS at 14:42

## 2019-07-28 RX ADMIN — SODIUM CHLORIDE 75 ML/HR: 0.9 INJECTION, SOLUTION INTRAVENOUS at 12:47

## 2019-07-28 RX ADMIN — LORAZEPAM 1 MG: 2 INJECTION, SOLUTION INTRAMUSCULAR; INTRAVENOUS at 12:15

## 2019-07-28 RX ADMIN — LIDOCAINE HYDROCHLORIDE ANHYDROUS 10 ML: 10 INJECTION, SOLUTION INFILTRATION at 12:55

## 2019-07-28 RX ADMIN — ENOXAPARIN SODIUM 40 MG: 40 INJECTION SUBCUTANEOUS at 14:42

## 2019-07-28 RX ADMIN — FENTANYL CITRATE 50 MCG: 50 INJECTION, SOLUTION INTRAMUSCULAR; INTRAVENOUS at 12:51

## 2019-07-28 RX ADMIN — FENTANYL CITRATE 50 MCG: 50 INJECTION, SOLUTION INTRAMUSCULAR; INTRAVENOUS at 13:27

## 2019-07-28 RX ADMIN — HEPARIN SODIUM 4000 UNITS: 1000 INJECTION INTRAVENOUS; SUBCUTANEOUS at 12:11

## 2019-07-28 NOTE — H&P
History and Physical - Cardiology   Anjum Connolly 37 y o  male MRN: [id number]  Unit/Bed#:  Encounter: [id number]  07/28/19  1:56 PM        History of Present Illness     HPI: Anjum Connolly is a 37y o  year old male who presents with weakness, dizziness, near syncope with ECG showing CHB  He works long hours but has noticed more symptoms of fatigue than usual for him  He denies any rashes, arthralgias  He dizziness started yesterday although he was able to sleep  The symptoms were there this AM     ECG - CHB, HR in the 30s  SBP stable in the 130s  Cardiac cath - patent coronary arteries  The RCA does come off high in the ascending aortic near the L coronary cusp  He denies HTN, thyroid disease, any previous syncopal episodes  He denies cardiac disease in his family  Review of Systems:    As above, otherwise unremarkable              Historical Information   Past Medical History:   Diagnosis Date    Arthritis     Back pain     Cancer ([address] )     skin cancer on face    DDD (degenerative disc disease), lumbar     Foot pain, left     History of herniated intervertebral disc     x 6    Obesity     Scratches     Hands    Seasonal allergies     Snores      Past Surgical History:   Procedure Laterality Date    [place] ANKLE SCOPE,PLANTAR FASCIOTOMY Left 12/26/2018    Procedure: RELEASE FASCIA PLANTAR/FASCIOTOMY ENDOSCOPIC (EPF); Surgeon: Lena Barnard DPM;  Location: [facility];  Service: Podiatry    SEPTOPLASTY      SKIN CANCER EXCISION      Facial- by nose    VASECTOMY       Social History     Substance and Sexual Activity   Alcohol Use Yes    Comment: Rarely- 3 monthly     Social History     Substance and Sexual Activity   Drug Use No     Social History     Tobacco Use   Smoking Status Former Smoker    Years: 2 00   Smokeless Tobacco Never Used     Family History: History reviewed  No pertinent family history      Meds/Allergies     Current Facility-Administered Medications:    acetaminophen (TYLENOL) tablet 650 mg, 650 mg, Oral, Q4H PRN, Stephania Larson MD    cefTRIAXone (ROCEPHIN) 2,000 mg in dextrose 5 % 50 mL IVPB, 2,000 mg, Intravenous, Q24H, Stephania Larson MD    Insert peripheral IV, , , Once **AND** sodium chloride (PF) 0 9 % injection 3 mL, 3 mL, Intravenous, PRN, Endy Meehan, DO  Allergies   Allergen Reactions    Penicillins Rash     Childhood       Objective   Vitals: Blood pressure 124/66, pulse (!) 40, temperature 99 2 °F (37 3 °C), temperature source Oral, resp  rate 20, weight 122 kg (268 lb 8 3 oz), SpO2 99 %  , Body mass index is 34 48 kg/m² ,   Orthostatic Blood Pressures      Most Recent Value   Blood Pressure  124/66 filed at 07/28/2019 1225   Patient Position - Orthostatic VS  Lying filed at 07/28/2019 1225          No intake or output data in the 24 hours ending 07/28/19 1356    Invasive Devices     Peripheral Intravenous Line            Peripheral IV 07/28/19 Right Antecubital less than 1 day          Line            Pacer Wires less than 1 day    Venous Sheath 6 Fr   Right Femoral less than 1 day          Airway            Supraglottic Airway LMA 4 214 days                    Physical Exam:  GEN: Akash De La Cruz appears well, alert and oriented x 3, pleasant and cooperative   HEENT: pupils equal, round, and reactive to light; extraocular muscles intact  NECK: supple, no carotid bruits or JVD  HEART: regular rhythm, normal S1 and S2, no murmurs, clicks, gallops or rubs   LUNGS: clear to auscultation bilaterally; no wheezes, rales, or rhonchi   ABDOMEN: normal bowel sounds, soft, no tenderness, no distention  EXTREMITIES: peripheral pulses normal; no clubbing, cyanosis, or edema  NEURO: no focal findings   SKIN: normal without suspicious lesions on exposed skin    Lab Results:   Admission on 07/28/2019   Component Date Value    WBC 07/28/2019 7 68     RBC 07/28/2019 4 29     Hemoglobin 07/28/2019 12 8     Hematocrit 07/28/2019 38 9     MCV 07/28/2019 91  MCH 07/28/2019 29 8     MCHC 07/28/2019 32 9     RDW 07/28/2019 11 8     MPV 07/28/2019 11 0     Platelets 33/56/1704 355     nRBC 07/28/2019 0     Neutrophils Relative 07/28/2019 72     Immat GRANS % 07/28/2019 0     Lymphocytes Relative 07/28/2019 19     Monocytes Relative 07/28/2019 8     Eosinophils Relative 07/28/2019 0     Basophils Relative 07/28/2019 1     Neutrophils Absolute 07/28/2019 5 59     Immature Grans Absolute 07/28/2019 0 02     Lymphocytes Absolute 07/28/2019 1 42     Monocytes Absolute 07/28/2019 0 59     Eosinophils Absolute 07/28/2019 0 02     Basophils Absolute 07/28/2019 0 04     Sodium 07/28/2019 138     Potassium 07/28/2019 4 1     Chloride 07/28/2019 103     CO2 07/28/2019 24     ANION GAP 07/28/2019 11     BUN 07/28/2019 21     Creatinine 07/28/2019 1 40*    Glucose 07/28/2019 141*    Calcium 07/28/2019 8 4     eGFR 07/28/2019 62     Cholesterol 07/28/2019 143     Triglycerides 07/28/2019 93     HDL, Direct 07/28/2019 33*    LDL Calculated 07/28/2019 91     Non-HDL-Chol (CHOL-HDL) 07/28/2019 110     Magnesium 07/28/2019 1 9     Protime 07/28/2019 14 3     INR 07/28/2019 1 17     PTT 07/28/2019 37     Troponin I 07/28/2019 0 10*    SODIUM, I-STAT 07/28/2019 138     Potassium, i-STAT 07/28/2019 4 1     Chloride, istat 07/28/2019 104     CO2, i-STAT 07/28/2019 21     Anion Gap, i-STAT 07/28/2019 18*    Calcium, Ionized i-STAT 07/28/2019 1 04*    BUN, I-STAT 07/28/2019 20     Creatinine, i-STAT 07/28/2019 1 4*    eGFR 07/28/2019 62     Glucose, i-STAT 07/28/2019 148*    Hct, i-STAT 07/28/2019 37     Hgb, i-STAT 07/28/2019 12 6     Specimen Type 07/28/2019 VENOUS        Imaging: I have personally reviewed pertinent reports  Xr Chest 1 View Portable    Result Date: 7/28/2019  Narrative: CHEST INDICATION:   MI  COMPARISON:  None EXAM PERFORMED/VIEWS:  XR CHEST PORTABLE FINDINGS: Cardiomediastinal silhouette appears unremarkable   The lungs are clear  No pneumothorax or pleural effusion  Osseous structures appear within normal limits for patient age  Impression: No acute abnormality in the chest  Workstation performed: [id number]           Assessment:  1  Complete heart block  2  Atypical CP  3  Dizziness      Chief Complaint   Patient presents with    Chest Pain     chest tightness, X approx 20 mins prior to arrival, + dizzy and lightheaded, fell last pm onto bed     Complete heart block ([address] ) [I44 2]  Chest pain [R07 9]  ST elevation [R94 31]  Elevated troponin I level [R74 8]    Plan:  1  Keep temporary pacemaker on - MA 5 , rate 60  Hopefully his CHB resolves quickly with treatment of presumed Lyme disease  2  Ceftriaxone 2 gm IV now and then daily  He has an allergy to Penicillin ( rash )   I will have him get his first dose of Ceftriaxone now observe for any side effects  3  Check Lyme titers  4  ID consult - they are aware  5  Check echo    This was discussed with the patient and his wife  Counseling / Coordination of Care  Total floor / unit time spent today 60 minutes  Greater than 50% of total time was spent with the patient and / or family counseling and / or coordination of care

## 2019-07-28 NOTE — CONSULTS
Consultation - Infectious Disease   Elle Solis 37 y o  male MRN: [id number]  Unit/Bed#:  Encounter: [id number]      IMPRESSION & RECOMMENDATIONS:   Impression/Recommendations:  1  Complete heart block  Consider due to Lyme disease  Cardiac catheterization unremarkable  Rec:  · Start ceftriaxone 2 g IV Q 24 hours  · Check Lyme antibody screen with reflex to Western blot  · Temporary pacemaker per Cardiology  · Monitor on telemetry in ICU    2   Probable Lyme disease  In setting of # 1  Rec:  · Start antibiotics as above  · Check Lyme antibody screen as above    Discussed the above impression and plan in detail with the patient and Dr Zaid Vivas    Antibiotics:  None    Thank you for this consultation  We will follow along with you  HISTORY OF PRESENT ILLNESS:  Reason for Consult:  Possible Lyme disease    HPI: Elle Solis is a 37 y o  male previously healthy  He states that over the past month he has felt fatigued but he attributed this to working 7 days week as a contractor  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  He also has a dog that he walks outside  He was in his usual state of health until yesterday when he he noticed severe fatigue with exertion and had 2 episodes of near-syncope  This morning he went to work and started to have chest discomfort and shortness of breath  He presented to the ED where he was found to be bradycardic in the 30s with complete heart block  Had some ST elevations on EKG and was taken to the cath lab which revealed no evidence of occlusive disease  A temporary pacemaker was placed  Given the concern for possible Lyme disease we are asked to comment on further evaluation and management  In performing this consult, I have reviewed prior admission and outpatient visit records in detail  REVIEW OF SYSTEMS:  Denies fevers, chills, sweats, nausea, vomiting, or diarrhea  Denies any recent flu-like syndrome, rash, or tick bite    A complete system-based review of systems is otherwise negative  PAST MEDICAL HISTORY:  Past Medical History:   Diagnosis Date    Arthritis     Back pain     Cancer ([address] )     skin cancer on face    DDD (degenerative disc disease), lumbar     Foot pain, left     History of herniated intervertebral disc     x 6    Obesity     Scratches     Hands    Seasonal allergies     Snores      Past Surgical History:   Procedure Laterality Date    [place] ANKLE SCOPE,PLANTAR FASCIOTOMY Left 2018    Procedure: RELEASE FASCIA PLANTAR/FASCIOTOMY ENDOSCOPIC (EPF); Surgeon: Leta Vera DPM;  Location: [facility];  Service: Podiatry    SEPTOPLASTY      SKIN CANCER EXCISION      Facial- by nose    VASECTOMY         FAMILY HISTORY:  Non-contributory    SOCIAL HISTORY:  Social History     Substance and Sexual Activity   Alcohol Use Yes    Comment: Rarely- 3 monthly     Social History     Substance and Sexual Activity   Drug Use No     Social History     Tobacco Use   Smoking Status Former Smoker    Years: 2 00   Smokeless Tobacco Never Used       ALLERGIES:  Allergies   Allergen Reactions    Penicillins Rash     Childhood       MEDICATIONS:  All current active medications have been reviewed      PHYSICAL EXAM:  Vitals:  Temp:  [98 3 °F (36 8 °C)-99 2 °F (37 3 °C)] 98 3 °F (36 8 °C)  HR:  [40-59] 59  Resp:  [16-25] 18  BP: (116-141)/(66-79) 121/79  SpO2:  [99 %-100 %] 100 %  Temp (24hrs), Av 6 °F (37 °C), Min:98 3 °F (36 8 °C), Max:99 2 °F (37 3 °C)  Current: Temperature: 98 3 °F (36 8 °C)     Physical Exam:  General:  Well-nourished, well-developed, in no acute distress  Eyes:  Conjunctive clear with no hemorrhages or effusions  Oropharynx:  No ulcers, no lesions  Neck:  Supple, no lymphadenopathy  Lungs:  Clear to auscultation bilaterally, no accessory muscle use  Cardiac:  Regular rate and rhythm, no murmurs  Abdomen:  Soft, non-tender, non-distended  Extremities:  No peripheral cyanosis, clubbing, or edema  Skin:  No rashes, no ulcers  Neurological:  Moves all four extremities spontaneously, sensation grossly intact    LABS, IMAGING, & OTHER STUDIES:  Lab Results:  I have personally reviewed pertinent labs  Results from last 7 days   Lab Units 07/28/19  1212 07/28/19  1153   POTASSIUM mmol/L 4 1  --    CHLORIDE mmol/L 103  --    CO2 mmol/L 24  --    CO2, I-STAT mmol/L  --  21   BUN mg/dL 21  --    CREATININE mg/dL 1 40*  --    EGFR ml/min/1 73sq m 62 62   GLUCOSE, ISTAT mg/dl  --  148*   CALCIUM mg/dL 8 4  --      Results from last 7 days   Lab Units 07/28/19  1212 07/28/19  1153   WBC Thousand/uL 7 68  --    HEMOGLOBIN g/dL 12 8  --    I STAT HEMOGLOBIN g/dl  --  12 6   PLATELETS Thousands/uL 355  --            Imaging Studies:   I have personally reviewed pertinent imaging study reports and images in PACS  CXR reviewed personally no pneumonia    EKG, Pathology, and Other Studies:   I have personally reviewed pertinent reports

## 2019-07-28 NOTE — ED PROVIDER NOTES
History  Chief Complaint   Patient presents with    Chest Pain     chest tightness, X approx 20 mins prior to arrival, + dizzy and lightheaded, fell last pm onto bed       History provided by:  Patient  Chest Pain   Pain location:  L chest  Pain quality: aching and tightness    Pain radiates to:  Does not radiate  Pain severity:  Moderate  Onset quality:  Sudden  Duration:  20 minutes  Timing:  Constant  Progression:  Worsening  Chronicity:  New  Context comment:  Yesterday felt weak and dizzy, as syncopal episode yesterday  Relieved by:  None tried  Worsened by:  Nothing tried  Ineffective treatments:  None tried  Associated symptoms: anxiety, dizziness, palpitations, shortness of breath and syncope    Associated symptoms: no abdominal pain, no cough, no diaphoresis, no fever, no headache, no nausea, no numbness and not vomiting    Risk factors: male sex, obesity and smoking        Prior to Admission Medications   Prescriptions Last Dose Informant Patient Reported? Taking?   acetaminophen (TYLENOL) 500 mg tablet   Yes No   Sig: Take 1,000 mg by mouth every 6 (six) hours as needed for mild pain   ibuprofen (MOTRIN) 600 mg tablet   Yes No   Sig: Take by mouth every 6 (six) hours as needed for mild pain      Facility-Administered Medications: None       Past Medical History:   Diagnosis Date    Arthritis     Back pain     Cancer ([address] )     skin cancer on face    DDD (degenerative disc disease), lumbar     Foot pain, left     History of herniated intervertebral disc     x 6    Obesity     Scratches     Hands    Seasonal allergies     Snores        Past Surgical History:   Procedure Laterality Date    [place] ANKLE SCOPE,PLANTAR FASCIOTOMY Left 12/26/2018    Procedure: RELEASE FASCIA PLANTAR/FASCIOTOMY ENDOSCOPIC (EPF); Surgeon: Andrey Torres DPM;  Location: [facility];  Service: Podiatry    SEPTOPLASTY      SKIN CANCER EXCISION      Facial- by nose    VASECTOMY         History reviewed   No pertinent family history  I have reviewed and agree with the history as documented  Social History     Tobacco Use    Smoking status: Former Smoker     Years: 2 00    Smokeless tobacco: Never Used   Substance Use Topics    Alcohol use: Yes     Comment: Rarely- 3 monthly    Drug use: No        Review of Systems   Constitutional: Negative for activity change, chills, diaphoresis and fever  HENT: Negative for congestion, sinus pressure and sore throat  Eyes: Negative for pain and visual disturbance  Respiratory: Positive for shortness of breath  Negative for cough, chest tightness, wheezing and stridor  Cardiovascular: Positive for chest pain, palpitations and syncope  Gastrointestinal: Negative for abdominal distention, abdominal pain, constipation, diarrhea, nausea and vomiting  Genitourinary: Negative for dysuria and frequency  Musculoskeletal: Negative for neck pain and neck stiffness  Skin: Negative for rash  Neurological: Positive for dizziness  Negative for speech difficulty, light-headedness, numbness and headaches  Physical Exam  Physical Exam   Constitutional: He is oriented to person, place, and time  He appears well-developed  No distress  HENT:   Head: Normocephalic and atraumatic  Eyes: Pupils are equal, round, and reactive to light  Neck: Normal range of motion  Neck supple  No tracheal deviation present  Cardiovascular: Normal heart sounds and intact distal pulses  Bradycardia present  No murmur heard  Complete heart block on the monitor   Pulmonary/Chest: Effort normal and breath sounds normal  No stridor  No respiratory distress  Abdominal: Soft  He exhibits no distension  There is no tenderness  There is no rebound and no guarding  Musculoskeletal: Normal range of motion  Neurological: He is alert and oriented to person, place, and time  Skin: Skin is warm and dry  He is not diaphoretic  No erythema  No pallor  Psychiatric: He has a normal mood and affect  Vitals reviewed        Vital Signs  ED Triage Vitals   Temperature Pulse Respirations Blood Pressure SpO2   07/28/19 1209 07/28/19 1151 07/28/19 1151 07/28/19 1151 07/28/19 1209   99 2 °F (37 3 °C) (!) 45 (!) 24 141/74 100 %      Temp Source Heart Rate Source Patient Position - Orthostatic VS BP Location FiO2 (%)   07/28/19 1209 07/28/19 1209 07/28/19 1209 07/28/19 1225 --   Oral Monitor Lying Right arm       Pain Score       07/28/19 1218       4           Vitals:    07/28/19 1151 07/28/19 1209 07/28/19 1216 07/28/19 1225   BP: 141/74  122/69 124/66   Pulse: (!) 45 (!) 43 (!) 41 (!) 40   Patient Position - Orthostatic VS:  Lying  Lying         Visual Acuity      ED Medications  Medications   sodium chloride (PF) 0 9 % injection 3 mL (has no administration in time range)   ticagrelor (BRILINTA) tablet 180 mg (180 mg Oral Given 7/28/19 1214)     And   ticagrelor (BRILINTA) tablet 90 mg (has no administration in time range)   sodium chloride 0 9 % infusion (75 mL/hr Intravenous New Bag 7/28/19 1247)   midazolam (VERSED) injection (2 mg Intravenous Given 7/28/19 1251)   fentanyl citrate (PF) 100 MCG/2ML (50 mcg Intravenous Given 7/28/19 1251)   lidocaine (XYLOCAINE) 1 % injection (10 mL Infiltration Given 7/28/19 1255)   heparin (porcine) injection 4,000 Units (4,000 Units Intravenous Given 7/28/19 1211)   LORazepam (ATIVAN) 2 mg/mL injection 1 mg (1 mg Intravenous Given 7/28/19 1215)   aspirin chewable tablet 324 mg (324 mg Oral Given 7/28/19 1212)       Diagnostic Studies  Results Reviewed     Procedure Component Value Units Date/Time    Lyme Antibody Profile with reflex to WB [[id number]]     Lab Status:  No result Specimen:  Blood     Troponin I [[id number]]  (Abnormal) Collected:  07/28/19 1212    Lab Status:  Final result Specimen:  Blood from Arm, Right Updated:  07/28/19 1236     Troponin I 0 10 ng/mL     Basic metabolic panel [[id number]]  (Abnormal) Collected:  07/28/19 1212    Lab Status:  Final result Specimen:  Blood from Arm, Right Updated:  07/28/19 1233     Sodium 138 mmol/L      Potassium 4 1 mmol/L      Chloride 103 mmol/L      CO2 24 mmol/L      ANION GAP 11 mmol/L      BUN 21 mg/dL      Creatinine 1 40 mg/dL      Glucose 141 mg/dL      Calcium 8 4 mg/dL      eGFR 62 ml/min/1 73sq m     Narrative:       [place] guidelines for Chronic Kidney Disease (CKD):     Stage 1 with normal or high GFR (GFR > 90 mL/min/1 73 square meters)    Stage 2 Mild CKD (GFR = 60-89 mL/min/1 73 square meters)    Stage 3A Moderate CKD (GFR = 45-59 mL/min/1 73 square meters)    Stage 3B Moderate CKD (GFR = 30-44 mL/min/1 73 square meters)    Stage 4 Severe CKD (GFR = 15-29 mL/min/1 73 square meters)    Stage 5 End Stage CKD (GFR <15 mL/min/1 73 square meters)  Note: GFR calculation is accurate only with a steady state creatinine    Lipid panel [[id number]]  (Abnormal) Collected:  07/28/19 1212    Lab Status:  Final result Specimen:  Blood from Arm, Right Updated:  07/28/19 1233     Cholesterol 143 mg/dL      Triglycerides 93 mg/dL      HDL, Direct 33 mg/dL      LDL Calculated 91 mg/dL      Non-HDL-Chol (CHOL-HDL) 110 mg/dl     Magnesium [[id number]]  (Normal) Collected:  07/28/19 1212    Lab Status:  Final result Specimen:  Blood from Arm, Right Updated:  07/28/19 1233     Magnesium 1 9 mg/dL     Protime-INR [[id number]]  (Normal) Collected:  07/28/19 1212    Lab Status:  Final result Specimen:  Blood from Arm, Right Updated:  07/28/19 1227     Protime 14 3 seconds      INR 1 17    APTT [[id number]]  (Normal) Collected:  07/28/19 1212    Lab Status:  Final result Specimen:  Blood from Arm, Right Updated:  07/28/19 1227     PTT 37 seconds     CBC and differential [[id number]] Collected:  07/28/19 1212    Lab Status:  Final result Specimen:  Blood from Arm, Right Updated:  07/28/19 1217     WBC 7 68 Thousand/uL      RBC 4 29 Million/uL      Hemoglobin 12 8 g/dL      Hematocrit 38 9 %      MCV 91 fL MCH 29 8 pg      MCHC 32 9 g/dL      RDW 11 8 %      MPV 11 0 fL      Platelets 867 Thousands/uL      nRBC 0 /100 WBCs      Neutrophils Relative 72 %      Immat GRANS % 0 %      Lymphocytes Relative 19 %      Monocytes Relative 8 %      Eosinophils Relative 0 %      Basophils Relative 1 %      Neutrophils Absolute 5 59 Thousands/µL      Immature Grans Absolute 0 02 Thousand/uL      Lymphocytes Absolute 1 42 Thousands/µL      Monocytes Absolute 0 59 Thousand/µL      Eosinophils Absolute 0 02 Thousand/µL      Basophils Absolute 0 04 Thousands/µL     POCT Chem 8+ [[id number]]  (Abnormal) Collected:  07/28/19 1153    Lab Status:  Final result Specimen:  Venous Updated:  07/28/19 1216     SODIUM, I-STAT 138 mmol/l      Potassium, i-STAT 4 1 mmol/L      Chloride, istat 104 mmol/L      CO2, i-STAT 21 mmol/L      Anion Gap, i-STAT 18 mmol/L      Calcium, Ionized i-STAT 1 04 mmol/L      BUN, I-STAT 20 mg/dl      Creatinine, i-STAT 1 4 mg/dl      eGFR 62 ml/min/1 73sq m      Glucose, i-STAT 148 mg/dl      Hct, i-STAT 37 %      Hgb, i-STAT 12 6 g/dl      Specimen Type VENOUS    Narrative:       National Kid[employer] guidelines for Chronic Kidney Disease (CKD):     Stage 1 with normal or high GFR (GFR > 90 mL/min/1 73 square meters)    Stage 2 Mild CKD (GFR = 60-89 mL/min/1 73 square meters)    Stage 3A Moderate CKD (GFR = 45-59 mL/min/1 73 square meters)    Stage 3B Moderate CKD (GFR = 30-44 mL/min/1 73 square meters)    Stage 4 Severe CKD (GFR = 15-29 mL/min/1 73 square meters)    Stage 5 End Stage CKD (GFR <15 mL/min/1 73 square meters)  Note: GFR calculation is accurate only with a steady state creatinine                 XR chest 1 view portable   ED Interpretation by Jean Claude Leblanc DO (07/28 9637)   No acute pathology                 Procedures  ECG 12 Lead Documentation Only  Date/Time: 7/28/2019 11:44 AM  Performed by: Jean Claude Leblanc DO  Authorized by: Jean Claude Leblanc DO     ECG reviewed by me, the ED Provider: yes    Patient location:  ED  Previous ECG:     Previous ECG:  Unavailable  Interpretation:     Interpretation: abnormal    Rate:     ECG rate:  42    ECG rate assessment: bradycardic    Rhythm:     Rhythm: A-V block      Rhythm comment:  Complete AV block  Ectopy:     Ectopy: none    QRS:     QRS axis:  Left    QRS intervals:  Normal  Conduction:     Conduction: normal    ST segments:     ST segments:  Non-specific  T waves:     T waves: normal    ECG 12 Lead Documentation Only  Date/Time: 7/28/2019 11:57 AM  Performed by: Anabela Burkett DO  Authorized by: Anabela Burkett DO     ECG reviewed by me, the ED Provider: yes    Patient location:  ED  Interpretation:     Interpretation: abnormal    Rate:     ECG rate:  45    ECG rate assessment: bradycardic    Rhythm:     Rhythm: A-V block    Ectopy:     Ectopy: none    QRS:     QRS axis:  Left    QRS intervals: Wide  Conduction:     Conduction: abnormal      Abnormal conduction: complete RBBB    ST segments:     ST segments:  Abnormal  T waves:     T waves: inverted    Comments:      Patient with change from previous EKG from 13 minutes prior  Patient now with deeper T-wave inversion and ST depression in V1, to a lesser degree V2 , ST elevation in leads II AVF, I V6 V5  ECG 12 Lead Documentation Only  Date/Time: 7/28/2019 12:05 PM  Performed by: Anabela Burkett DO  Authorized by: Anabela Burkett DO     Previous ECG:     Previous ECG:  Compared to current    Comparison ECG info:  Two previous ED EKGs  Interpretation:     Interpretation: abnormal    Rate:     ECG rate:  45    ECG rate assessment: bradycardic    Rhythm:     Rhythm: A-V block    Ectopy:     Ectopy: none    QRS:     QRS axis:  Left    QRS intervals:   Wide  Conduction:     Conduction: abnormal      Abnormal conduction: complete RBBB    ST segments:     ST segments:  Abnormal  T waves:     T waves: non-specific and inverted    Comments:      Patient with more impressive ST elevation and to AVF, worsening depression in V1 V2, continued elevation V5 V6, new elevation of V4  This time dynamic EKG concerning for STEMI in the setting of complete heart block, MI alert was called  CriticalCare Time  Performed by: Edwin De Jesus DO  Authorized by: Edwin De Jesus DO     Critical care provider statement:     Critical care time (minutes):  40    Critical care time was exclusive of:  Separately billable procedures and treating other patients    Critical care was necessary to treat or prevent imminent or life-threatening deterioration of the following conditions: Third degree heart block with STEMI criteria on EKG  Critical care was time spent personally by me on the following activities:  Obtaining history from patient or surrogate, development of treatment plan with patient or surrogate, discussions with consultants, evaluation of patient's response to treatment, examination of patient, ordering and performing treatments and interventions, ordering and review of laboratory studies, ordering and review of radiographic studies, re-evaluation of patient's condition and review of old charts           ED Course  ED Course as of Jul 28 1325   Sun Jul 28, 2019   1210 Delay in MI alert being called due to first EKG without STEMI, 3rd EKG 30 after presentation with stemi criteria                                    MDM  Number of Diagnoses or Management Options  Complete heart block ([facility] [address] ): new and requires workup  Elevated troponin I level: new and requires workup  ST elevation: new and requires workup  Diagnosis management comments:       Initial ED assessment:   55-year-old male, diaphoretic, chest pain, found to be in third-degree heart block, moderate chest pain that started an hour prior to arrival   EKG showing heart block no evidence of STEMI    Initial DDx includes but is not limited to:   Complete heart block, NSTEMI from heart block verses angina    Heart block no clear identifying cause, will check for Lyme    Initial ED plan:   Serial EKGs, serial evaluations, patient hooked up to transcutaneous pacer if the patient decompensates I will sedate patient and used transcutaneous pacer        Final ED summary/disposition:   After evaluation and workup in the emergency department, patient's EKG of although into what appeared to be a STEMI  MI alert was called  Patient brought to cardiac cath lab  Amount and/or Complexity of Data Reviewed  Clinical lab tests: ordered and reviewed  Tests in the radiology section of CPT®: ordered and reviewed  Decide to obtain previous medical records or to obtain history from someone other than the patient: yes  Obtain history from someone other than the patient: yes  Review and summarize past medical records: yes  Discuss the patient with other providers: yes  Independent visualization of images, tracings, or specimens: yes        Disposition  Final diagnoses:   Complete heart block (HCC)   Elevated troponin I level   ST elevation     Time reflects when diagnosis was documented in both MDM as applicable and the Disposition within this note     Time User Action Codes Description Comment    7/28/2019 12:48 PM Samara Casey Add [I44 2] Complete heart block ([address] )     7/28/2019  1:17 PM Lady Deyanira LEACH Add [R74 8] Elevated troponin I level     7/28/2019  1:25 PM Samara Lr [R94 31] ST elevation       ED Disposition     ED Disposition Condition Date/Time Comment    Send to Cath Lab  Sun Jul 28, 2019 12:48 PM       Follow-up Information    None         Patient's Medications   Discharge Prescriptions    No medications on file     No discharge procedures on file      ED Provider  Electronically Signed by           Dago Borjas DO  07/28/19 8403

## 2019-07-28 NOTE — CONSULTS
[facility] 37 y o  male MRN: [id number]  Unit/Bed#:  Encounter: [id number]    Physician Requesting Consult: Dr Kiara Mccormack  Reason for Consult: CHB    Assessment/Plan:  1  Neuro  · Cont to monitor neuro status  2  CV  · CHB- Temporary pacer via right groin with rate set at 60  Hemodynamically stable with clean cath completed today by Dr Kiara Mccormack  Cont tele and Rocephin started for presumed lyme disease  Titers sent, cont to monitor  Will order TSH  3  Pulm  · Stable- cont to monitor  4  GI  · Cont cardiac diet  5    · Voids without concerns  6  Endocrine  · Stable  7  ID  · ID consulted and has spoken with Dr Kiara Mccormack regarding dosing for lyme disease  Lyme titers sent and pending  8  Renal  · Possible XIOMARA- Cr bumped to 1 4, would cont IVF as ordered and repeat am labs  Continue management as per primary team   Thank you kindly for this consultation we will follow along  Critical Care Time: 35 minutes  Documented critical care time excludes any procedures documented elsewhere  It also excludes any family updates    _____________________________________________________________________      HPI:    Wilbert Khan is a 37 y o  healthy male who presents to the ER today complaining of fatigue, lightheadedness chest pain and mildly short of breath  Yesterday he had worsening fatigue and had 2 episodes of dizziness where he felt like he was going to pass out  This morning when he woke up he started with chest discomfort that was mild and shortness of breath  He called his wife and they came to the emergency department  On arrival he was found to be bradycardic in the 30s with complete heart block  Due to some ST elevations on EKG and troponin of 0 1, the patient was taken to the cath lab by Dr Kiara Mccormack  The cardiac catheterization revealed patent coronary arteries with the RCA coming off high in the ascending aorta near the left coronary cusp    A temporary pacer was placed with a rate set at 60  Dr Anais Almeida  contacted Infectious Disease and began treatment for Lyme disease with Rocephin 2 g daily  A Lyme titer has been sent  The patient did not note any rashes or lesions recently  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  The patient also has a dog which he frequently walks outside  Other than fatigue lately, which she has attributed to working long hours and he recently the patient has otherwise been feeling healthy and well  There is no family history of cardiac disease  The patient has been transferred to the ICU post catheterization for continuous monitoring  Review of Systems:  Denies abdominal pain, nausea, vomiting, blood in stool, dysuria, change in vision, headache  Full 12 point review of systems was performed  Aside from what was mentioned in the HPI, it is otherwise negative  Historical Information   Past Medical History:   Diagnosis Date    Arthritis     Back pain     Cancer ([address] )     skin cancer on face    DDD (degenerative disc disease), lumbar     Foot pain, left     History of herniated intervertebral disc     x 6    Obesity     Scratches     Hands    Seasonal allergies     Snores      Past Surgical History:   Procedure Laterality Date    [place] ANKLE SCOPE,PLANTAR FASCIOTOMY Left 12/26/2018    Procedure: RELEASE FASCIA PLANTAR/FASCIOTOMY ENDOSCOPIC (EPF); Surgeon: Elli Campuzano DPM;  Location: [facility];  Service: Podiatry    SEPTOPLASTY      SKIN CANCER EXCISION      Facial- by nose    VASECTOMY       Social History   Social History     Substance and Sexual Activity   Alcohol Use Yes    Comment: Rarely- 3 monthly     Social History     Substance and Sexual Activity   Drug Use No     Social History     Tobacco Use   Smoking Status Former Smoker    Years: 2 00   Smokeless Tobacco Never Used       Family History:   History reviewed  No pertinent family history      Medications:  Pertinent medications were reviewed    Current Facility-Administered Medications:  acetaminophen 650 mg Oral Q4H PRN Nkechi Bolanos MD    cefTRIAXone 2,000 mg Intravenous Q24H Nkechi Bolanos MD Last Rate: Stopped (07/28/19 1512)   enoxaparin 40 mg Subcutaneous Daily Nkechi Bolanos MD    oxyCODONE 5 mg Oral Q4H PRN Nkechi Bolanos MD    sodium chloride (PF) 3 mL Intravenous PRN Endy De Souza DO    sodium chloride 100 mL/hr Intravenous Continuous Nkechi Bolanos MD          Allergies   Allergen Reactions    Penicillins Rash     Childhood         Vitals:   /77 (BP Location: Left arm)   Pulse 59   Temp 98 5 °F (36 9 °C) (Oral)   Resp 18   Wt 122 kg (268 lb 8 3 oz)   SpO2 100%   BMI 34 48 kg/m²   Body mass index is 34 48 kg/m²  SpO2: 100 %,   SpO2 Activity: At Rest,   O2 Device: Nasal cannula      Intake/Output Summary (Last 24 hours) at 7/28/2019 1513  Last data filed at 7/28/2019 1400  Gross per 24 hour   Intake 90 52 ml   Output    Net 90 52 ml     Invasive Devices     Peripheral Intravenous Line            Peripheral IV 07/28/19 Right Antecubital less than 1 day          Line            Pacer Wires less than 1 day    Venous Sheath 6 Fr  Right Femoral less than 1 day          Airway            Supraglottic Airway LMA 4 214 days                Physical Exam:  Gen:  No acute distress  HEENT: PERRLA  Neck:  Supple  Chest:  Regular rate and rhythm no murmurs rubs or gallops  Pulm:  Clear to auscultation bilateral  Abd:  Positive bowel sounds, soft, nontender  Ext:  Negative for edema  Neuro: AAO X3, no focal deficits  Skin:  No visible rashes      Diagnostic Data:  Lab: I have personally reviewed pertinent lab results  ,   CBC:  Results from last 7 days   Lab Units 07/28/19  1212   WBC Thousand/uL 7 68   HEMOGLOBIN g/dL 12 8   HEMATOCRIT % 38 9   PLATELETS Thousands/uL 355      CMP: Lab Results   Component Value Date    SODIUM 138 07/28/2019    K 4 1 07/28/2019     07/28/2019    CO2 24 07/28/2019    CO2 21 07/28/2019    BUN 21 07/28/2019    CREATININE 1 40 (H) 07/28/2019    GLUCOSE 148 (H) 07/28/2019    CALCIUM 8 4 07/28/2019    EGFR 62 07/28/2019    EGFR 62 07/28/2019   ,   PT/INR:   Lab Results   Component Value Date    INR 1 17 07/28/2019   ,   Magnesium: No components found for: MAG,  Phosphorous: No results found for: PHOS    ABG: No results found for: PHART, YQF7XGZ, PO2ART, IQI7TDG, Y3UGGHIC, BEART, SOURCE,     Microbiology:        Imaging: I have personally reviewed the pertinent imaging studies on the PACS system    Cardiac/EKG/telemetry/Echo:   Results from last 7 days   Lab Units 07/28/19  1212   TROPONIN I ng/mL 0 10*       VTE Prophylaxis: Enoxaparin (Lovenox)    Code Status: Level 1 - Full Code    Lorna Angela PA-C    Portions of the record may have been created with voice recognition software  Occasional wrong word or "sound a like" substitutions may have occurred due to the inherent limitations of voice recognition software  Read the chart carefully and recognize, using context, where substitutions have occurred

## 2019-07-29 ENCOUNTER — APPOINTMENT (INPATIENT)
Dept: NON INVASIVE DIAGNOSTICS | Facility: HOSPITAL | Age: 39
DRG: 867 | End: 2019-07-29
Payer: COMMERCIAL

## 2019-07-29 ENCOUNTER — HOSPITAL ENCOUNTER (INPATIENT)
Facility: HOSPITAL | Age: 39
LOS: 3 days | Discharge: HOME | DRG: 868 | End: 2019-08-01
Attending: EMERGENCY MEDICINE | Admitting: INTERNAL MEDICINE
Payer: COMMERCIAL

## 2019-07-29 VITALS
BODY MASS INDEX: 34.25 KG/M2 | OXYGEN SATURATION: 98 % | RESPIRATION RATE: 18 BRPM | SYSTOLIC BLOOD PRESSURE: 120 MMHG | WEIGHT: 266.76 LBS | HEART RATE: 59 BPM | DIASTOLIC BLOOD PRESSURE: 72 MMHG | TEMPERATURE: 98.3 F

## 2019-07-29 DIAGNOSIS — I44.2 COMPLETE HEART BLOCK (HCC): Primary | ICD-10-CM

## 2019-07-29 DIAGNOSIS — A69.29 LYME CARDITIS: ICD-10-CM

## 2019-07-29 PROBLEM — D64.9 ANEMIA: Status: ACTIVE | Noted: 2019-07-29

## 2019-07-29 PROBLEM — G89.29 CHRONIC MIDLINE LOW BACK PAIN WITHOUT SCIATICA: Status: ACTIVE | Noted: 2019-07-29

## 2019-07-29 PROBLEM — M54.50 CHRONIC MIDLINE LOW BACK PAIN WITHOUT SCIATICA: Status: ACTIVE | Noted: 2019-07-29

## 2019-07-29 PROBLEM — D64.9 ANEMIA: Status: RESOLVED | Noted: 2019-07-29 | Resolved: 2019-07-29

## 2019-07-29 LAB
ANION GAP SERPL CALCULATED.3IONS-SCNC: 7 MMOL/L (ref 4–13)
ATRIAL RATE: 60 BPM
ATRIAL RATE: 97 BPM
B BURGDOR IGG SER IA-ACNC: 2.35
B BURGDOR IGM SER IA-ACNC: 15.51
BILIRUB UR QL STRIP: NEGATIVE
BUN SERPL-MCNC: 19 MG/DL (ref 5–25)
CALCIUM SERPL-MCNC: 7.8 MG/DL (ref 8.3–10.1)
CHLORIDE SERPL-SCNC: 103 MMOL/L (ref 100–108)
CLARITY UR: CLEAR
CO2 SERPL-SCNC: 28 MMOL/L (ref 21–32)
COLOR UR: YELLOW
CREAT SERPL-MCNC: 1.4 MG/DL (ref 0.6–1.3)
ERYTHROCYTE [DISTWIDTH] IN BLOOD BY AUTOMATED COUNT: 12.1 % (ref 11.6–15.1)
GFR SERPL CREATININE-BSD FRML MDRD: 62 ML/MIN/1.73SQ M
GLUCOSE SERPL-MCNC: 113 MG/DL (ref 65–140)
GLUCOSE UR STRIP-MCNC: NEGATIVE MG/DL
HCT VFR BLD AUTO: 35.7 % (ref 36.5–49.3)
HGB BLD-MCNC: 11.6 G/DL (ref 12–17)
HGB UR QL STRIP.AUTO: NEGATIVE
KETONES UR STRIP-MCNC: NEGATIVE MG/DL
LEUKOCYTE ESTERASE UR QL STRIP: NEGATIVE
MAGNESIUM SERPL-MCNC: 1.8 MG/DL (ref 1.6–2.6)
MCH RBC QN AUTO: 30.2 PG (ref 26.8–34.3)
MCHC RBC AUTO-ENTMCNC: 32.5 G/DL (ref 31.4–37.4)
MCV RBC AUTO: 93 FL (ref 82–98)
NITRITE UR QL STRIP: NEGATIVE
P AXIS: 57 DEGREES
PH UR STRIP.AUTO: 5.5 [PH]
PLATELET # BLD AUTO: 280 THOUSANDS/UL (ref 149–390)
PMV BLD AUTO: 10.5 FL (ref 8.9–12.7)
POTASSIUM SERPL-SCNC: 4.1 MMOL/L (ref 3.5–5.3)
PROT UR STRIP-MCNC: NEGATIVE MG/DL
QRS AXIS: -47 DEGREES
QRS AXIS: -62 DEGREES
QRSD INTERVAL: 179 MS
QRSD INTERVAL: 212 MS
QT INTERVAL: 471 MS
QT INTERVAL: 500 MS
QTC INTERVAL: 471 MS
QTC INTERVAL: 500 MS
RBC # BLD AUTO: 3.84 MILLION/UL (ref 3.88–5.62)
SODIUM SERPL-SCNC: 138 MMOL/L (ref 136–145)
SP GR UR STRIP.AUTO: >=1.03 (ref 1–1.03)
T WAVE AXIS: 86 DEGREES
T WAVE AXIS: 91 DEGREES
UROBILINOGEN UR QL STRIP.AUTO: 0.2 E.U./DL
VENTRICULAR RATE: 60 BPM
VENTRICULAR RATE: 60 BPM
WBC # BLD AUTO: 11.17 THOUSAND/UL (ref 4.31–10.16)

## 2019-07-29 PROCEDURE — 81003 URINALYSIS AUTO W/O SCOPE: CPT | Performed by: PHYSICIAN ASSISTANT

## 2019-07-29 PROCEDURE — NC001 PR NO CHARGE: Performed by: PHYSICIAN ASSISTANT

## 2019-07-29 PROCEDURE — 99239 HOSP IP/OBS DSCHRG MGMT >30: CPT | Performed by: PHYSICIAN ASSISTANT

## 2019-07-29 PROCEDURE — 80048 BASIC METABOLIC PNL TOTAL CA: CPT | Performed by: INTERNAL MEDICINE

## 2019-07-29 PROCEDURE — 83735 ASSAY OF MAGNESIUM: CPT | Performed by: INTERNAL MEDICINE

## 2019-07-29 PROCEDURE — 85027 COMPLETE CBC AUTOMATED: CPT | Performed by: INTERNAL MEDICINE

## 2019-07-29 PROCEDURE — 87040 BLOOD CULTURE FOR BACTERIA: CPT | Performed by: PHYSICIAN ASSISTANT

## 2019-07-29 PROCEDURE — 4B02XSZ MEASUREMENT OF CARDIAC PACEMAKER, EXTERNAL APPROACH: ICD-10-PCS | Performed by: INTERNAL MEDICINE

## 2019-07-29 PROCEDURE — 93010 ELECTROCARDIOGRAM REPORT: CPT | Performed by: INTERNAL MEDICINE

## 2019-07-29 PROCEDURE — 93306 TTE W/DOPPLER COMPLETE: CPT | Performed by: INTERNAL MEDICINE

## 2019-07-29 PROCEDURE — NC001 PR NO CHARGE: Performed by: INTERNAL MEDICINE

## 2019-07-29 PROCEDURE — 93306 TTE W/DOPPLER COMPLETE: CPT

## 2019-07-29 PROCEDURE — 99231 SBSQ HOSP IP/OBS SF/LOW 25: CPT | Performed by: EMERGENCY MEDICINE

## 2019-07-29 PROCEDURE — 93005 ELECTROCARDIOGRAM TRACING: CPT

## 2019-07-29 PROCEDURE — 99232 SBSQ HOSP IP/OBS MODERATE 35: CPT | Performed by: INTERNAL MEDICINE

## 2019-07-29 PROCEDURE — 99232 SBSQ HOSP IP/OBS MODERATE 35: CPT | Performed by: PHYSICIAN ASSISTANT

## 2019-07-29 RX ORDER — LANOLIN ALCOHOL/MO/W.PET/CERES
3 CREAM (GRAM) TOPICAL
Status: CANCELLED | OUTPATIENT
Start: 2019-07-29

## 2019-07-29 RX ORDER — HYDROMORPHONE HCL/PF 1 MG/ML
1 SYRINGE (ML) INJECTION ONCE
Status: COMPLETED | OUTPATIENT
Start: 2019-07-29 | End: 2019-07-29

## 2019-07-29 RX ORDER — OXYCODONE HYDROCHLORIDE 10 MG/1
10 TABLET ORAL EVERY 4 HOURS PRN
Status: DISCONTINUED | OUTPATIENT
Start: 2019-07-29 | End: 2019-08-01 | Stop reason: HOSPADM

## 2019-07-29 RX ORDER — LIDOCAINE 50 MG/G
1 PATCH TOPICAL DAILY
Status: DISCONTINUED | OUTPATIENT
Start: 2019-07-29 | End: 2019-08-01 | Stop reason: HOSPADM

## 2019-07-29 RX ORDER — HYDROMORPHONE HCL/PF 1 MG/ML
1 SYRINGE (ML) INJECTION ONCE
Status: DISCONTINUED | OUTPATIENT
Start: 2019-07-29 | End: 2019-07-29

## 2019-07-29 RX ORDER — SODIUM CHLORIDE 9 MG/ML
100 INJECTION, SOLUTION INTRAVENOUS CONTINUOUS
Status: DISCONTINUED | OUTPATIENT
Start: 2019-07-29 | End: 2019-07-29 | Stop reason: HOSPADM

## 2019-07-29 RX ORDER — KETOROLAC TROMETHAMINE 30 MG/ML
30 INJECTION, SOLUTION INTRAMUSCULAR; INTRAVENOUS ONCE
Status: COMPLETED | OUTPATIENT
Start: 2019-07-29 | End: 2019-07-29

## 2019-07-29 RX ORDER — OXYCODONE HYDROCHLORIDE 5 MG/1
5 TABLET ORAL EVERY 4 HOURS PRN
Status: DISCONTINUED | OUTPATIENT
Start: 2019-07-29 | End: 2019-08-01 | Stop reason: HOSPADM

## 2019-07-29 RX ORDER — 0.9 % SODIUM CHLORIDE 0.9 %
3 VIAL (ML) INJECTION AS NEEDED
Status: CANCELLED | OUTPATIENT
Start: 2019-07-29

## 2019-07-29 RX ORDER — LANOLIN ALCOHOL/MO/W.PET/CERES
3 CREAM (GRAM) TOPICAL
Status: DISCONTINUED | OUTPATIENT
Start: 2019-07-29 | End: 2019-08-01 | Stop reason: HOSPADM

## 2019-07-29 RX ORDER — BACLOFEN 10 MG/1
10 TABLET ORAL 3 TIMES DAILY
Status: DISCONTINUED | OUTPATIENT
Start: 2019-07-29 | End: 2019-08-01 | Stop reason: HOSPADM

## 2019-07-29 RX ORDER — SODIUM CHLORIDE 9 MG/ML
100 INJECTION, SOLUTION INTRAVENOUS CONTINUOUS
Status: DISCONTINUED | OUTPATIENT
Start: 2019-07-29 | End: 2019-07-31

## 2019-07-29 RX ORDER — HYDROMORPHONE HCL/PF 1 MG/ML
1 SYRINGE (ML) INJECTION ONCE
Status: CANCELLED | OUTPATIENT
Start: 2019-07-29

## 2019-07-29 RX ORDER — HYDROMORPHONE HCL/PF 1 MG/ML
1 SYRINGE (ML) INJECTION EVERY 4 HOURS PRN
Status: DISCONTINUED | OUTPATIENT
Start: 2019-07-29 | End: 2019-07-31

## 2019-07-29 RX ORDER — OXYCODONE HYDROCHLORIDE 5 MG/1
5 TABLET ORAL EVERY 4 HOURS PRN
Status: CANCELLED | OUTPATIENT
Start: 2019-07-29

## 2019-07-29 RX ORDER — SIMETHICONE 80 MG
80 TABLET,CHEWABLE ORAL EVERY 6 HOURS PRN
Status: DISCONTINUED | OUTPATIENT
Start: 2019-07-29 | End: 2019-08-01 | Stop reason: HOSPADM

## 2019-07-29 RX ORDER — ACETAMINOPHEN 325 MG/1
650 TABLET ORAL EVERY 4 HOURS PRN
Status: CANCELLED | OUTPATIENT
Start: 2019-07-29

## 2019-07-29 RX ORDER — 0.9 % SODIUM CHLORIDE 0.9 %
3 VIAL (ML) INJECTION AS NEEDED
Status: DISCONTINUED | OUTPATIENT
Start: 2019-07-29 | End: 2019-08-01 | Stop reason: HOSPADM

## 2019-07-29 RX ORDER — MAGNESIUM SULFATE HEPTAHYDRATE 40 MG/ML
2 INJECTION, SOLUTION INTRAVENOUS ONCE
Status: COMPLETED | OUTPATIENT
Start: 2019-07-29 | End: 2019-07-29

## 2019-07-29 RX ORDER — SODIUM CHLORIDE 9 MG/ML
100 INJECTION, SOLUTION INTRAVENOUS CONTINUOUS
Status: CANCELLED | OUTPATIENT
Start: 2019-07-29

## 2019-07-29 RX ORDER — ACETAMINOPHEN 325 MG/1
650 TABLET ORAL EVERY 4 HOURS PRN
Status: DISCONTINUED | OUTPATIENT
Start: 2019-07-29 | End: 2019-08-01 | Stop reason: HOSPADM

## 2019-07-29 RX ADMIN — ENOXAPARIN SODIUM 40 MG: 40 INJECTION SUBCUTANEOUS at 10:00

## 2019-07-29 RX ADMIN — OXYCODONE HYDROCHLORIDE 5 MG: 5 TABLET ORAL at 10:25

## 2019-07-29 RX ADMIN — OXYCODONE HYDROCHLORIDE 10 MG: 10 TABLET ORAL at 19:33

## 2019-07-29 RX ADMIN — HYDROMORPHONE HYDROCHLORIDE 1 MG: 1 INJECTION, SOLUTION INTRAMUSCULAR; INTRAVENOUS; SUBCUTANEOUS at 11:02

## 2019-07-29 RX ADMIN — BACLOFEN 10 MG: 10 TABLET ORAL at 21:06

## 2019-07-29 RX ADMIN — ACETAMINOPHEN 650 MG: 325 TABLET ORAL at 15:08

## 2019-07-29 RX ADMIN — OXYCODONE HYDROCHLORIDE 10 MG: 10 TABLET ORAL at 15:08

## 2019-07-29 RX ADMIN — HYDROMORPHONE HYDROCHLORIDE 1 MG: 1 INJECTION, SOLUTION INTRAMUSCULAR; INTRAVENOUS; SUBCUTANEOUS at 12:52

## 2019-07-29 RX ADMIN — SIMETHICONE CHEW TAB 80 MG 80 MG: 80 TABLET ORAL at 21:06

## 2019-07-29 RX ADMIN — MAGNESIUM SULFATE HEPTAHYDRATE 2 G: 40 INJECTION, SOLUTION INTRAVENOUS at 04:23

## 2019-07-29 RX ADMIN — PERFLUTREN 0.8 ML/MIN: 6.52 INJECTION, SUSPENSION INTRAVENOUS at 09:30

## 2019-07-29 RX ADMIN — HYDROMORPHONE HYDROCHLORIDE 1 MG: 1 INJECTION, SOLUTION INTRAMUSCULAR; INTRAVENOUS; SUBCUTANEOUS at 03:05

## 2019-07-29 RX ADMIN — BACLOFEN 10 MG: 10 TABLET ORAL at 15:08

## 2019-07-29 RX ADMIN — SODIUM CHLORIDE 100 ML/HR: 0.9 INJECTION, SOLUTION INTRAVENOUS at 12:53

## 2019-07-29 RX ADMIN — HYDROMORPHONE HYDROCHLORIDE 1 MG: 1 INJECTION, SOLUTION INTRAMUSCULAR; INTRAVENOUS; SUBCUTANEOUS at 08:50

## 2019-07-29 RX ADMIN — MELATONIN 3 MG: 3 TAB ORAL at 21:06

## 2019-07-29 RX ADMIN — CEFTRIAXONE 2000 MG: 2 INJECTION, POWDER, FOR SOLUTION INTRAMUSCULAR; INTRAVENOUS at 14:00

## 2019-07-29 RX ADMIN — KETOROLAC TROMETHAMINE 30 MG: 30 INJECTION, SOLUTION INTRAMUSCULAR at 16:13

## 2019-07-29 RX ADMIN — LIDOCAINE 1 PATCH: 50 PATCH CUTANEOUS at 19:33

## 2019-07-29 NOTE — SOCIAL WORK
CM saw that there was a discharge order in for patient and contacted [facility] and spoke with [facility] who reported that patient was being transferred to [facility] for possible a possible temporary vs permanent pacemaker  Patient has a commercial insurance so CM contacted PACS and spoke to Marisa to notify her that CM would be calling insurance company to get prior auth for the ALS transport and let them know what companies are in in network  She reported she would like nurse Jean Pierre Bernal know  CM contacted patient's insurance [phone] and was transferred numerous times speaking to Hoang Cline, and [place]  Finally ANDERSON reviewed all pre-cert information only to be told that patient did not require pre-cert for this transport and that it did not matter what transportation company was used to complete the transport  The Reference number for the call is [id number]  CM called PACS and spoke with Jean Pierre Bernal to notify them that no pre-cert was required and that any company could be used only to be told that they had already scheduled the transport with [facility]  No further CM discharge needs were discussed or identified

## 2019-07-29 NOTE — PROGRESS NOTES
Progress Note - Cardiology   Gustavo Goldstein 37 y o  male MRN: [id number]  Encounter: [id number]  07/29/19  8:27 AM        Assessment/Plan:    1  Complete heart block s/p TVP 7/28/19  Remains in CHB based on telemetry  Being V paced via R femoral TVP at 60 BPM  Has a lot of back pain due to known history of herniated discs, very uncomfortable lying flat in bed on bedrest due to TVP  Recommend transfer to [facility] for TVP via IJ versus temp perm PM placement, given unclear duration of need/requirement for PM at this time  EP made aware of patient  Spoke with ICU team as well for transfer to [facility] when bed available  2  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  On Ceftriaxone 2 g IV qd since 7/28/19  Lyme titers pending  Thus far no improvement seen in AV block  Echo being done at bedside  3  Non MI troponin elevation of 0 10  Likely due to Lyme carditis/CHB with bradycardia  Follow up echo  4  XIOMARA  Creatinine 1 4, unclear baseline, thus far stable  Subjective/Objective   Chief Complaint:   Chief Complaint   Patient presents with    Chest Pain     chest tightness, X approx 20 mins prior to arrival, + dizzy and lightheaded, fell last pm onto bed         Subjective: 37 y o  man who presented to T 7/28/19 with weakness, dizziness, and near syncope, EKG showed complete heart block with HR in 30s and T wave changes  He underwent cardiac cath showing no significant obstructive CAD, and had placement of TVP via right femoral vein  He was started on IV ceftriaxone for presumed Lyme carditis as cause of his CHB  This morning he remains on bedrest due to femoral TVP, and complains of back pain  He reports a history of 4 herniated discs, so has difficulty remaining on his back  Febrile to 101 5 overnight  No current CP or SOB  No LE edema  No obvious rashes         Patient Active Problem List   Diagnosis    Complete heart block (HCC)    [facility]ARA [facility]) [facility] [address] [facility]    Anemia     Past Medical History: Diagnosis Date    Arthritis     Back pain     Cancer ([facility] [address] )     skin cancer on face    DDD (degenerative disc disease), lumbar     Foot pain, left     History of herniated intervertebral disc     x 6    Obesity     Scratches     Hands    Seasonal allergies     Snores        Allergies   Allergen Reactions    Penicillins Rash     Childhood       Current Facility-Administered Medications   Medication Dose Route Frequency Provider Last Rate Last Dose    acetaminophen (TYLENOL) tablet 650 mg  650 mg Oral Q4H PRN Patrick Dai CRDOT        cefTRIAXone (ROCEPHIN) 2,000 mg in dextrose 5 % 50 mL IVPB  2,000 mg Intravenous Q24H Marky Granado MD   Stopped at 07/28/19 1512    enoxaparin (LOVENOX) subcutaneous injection 40 mg  40 mg Subcutaneous Daily Marky Granado MD   40 mg at 07/28/19 1442    melatonin tablet 3 mg  3 mg Oral HS Mary Carmen James PA-C   3 mg at 07/28/19 2216    oxyCODONE (ROXICODONE) IR tablet 5 mg  5 mg Oral Q4H PRN Marky Granado MD        sodium chloride (PF) 0 9 % injection 3 mL  3 mL Intravenous PRN Endy Meehan DO           Vitals: /68   Pulse 60   Temp 98 3 °F (36 8 °C) (Oral)   Resp 13   Wt 121 kg (266 lb 12 1 oz)   SpO2 98%   BMI 34 25 kg/m²     Intake/Output Summary (Last 24 hours) at 7/29/2019 0827  Last data filed at 7/28/2019 2000  Gross per 24 hour   Intake 590 52 ml   Output 400 ml   Net 190 52 ml     Wt Readings from Last 3 Encounters:   07/29/19 121 kg (266 lb 12 1 oz)   12/26/18 118 kg (260 lb)   03/27/17 118 kg (260 lb)       Body mass index is 34 25 kg/m²  ,     Vitals:    07/29/19 0300 07/29/19 0400 07/29/19 0500 07/29/19 0700   BP: 108/65 95/51 97/58 102/68   Pulse: 59 59 59 60   Patient Position - Orthostatic VS:           Physical Exam:     GEN: Alert and oriented x 3, in no acute distress  HEENT: Sclera anicteric, conjunctivae pink, mucous membranes moist   NECK: Supple, no carotid bruits, no significant JVD     HEART: Regular rhythm, normal S1 and S2, no murmurs, clicks, gallops or rubs  LUNGS: Clear to auscultation bilaterally; no wheezes, rales, or rhonchi   ABDOMEN: Soft, nontender, nondistended, normoactive bowel sounds  EXTREMITIES: Skin warm and well perfused, no clubbing, cyanosis, or edema  No obvious swelling or significant hematoma appreciated at right femoral cath/TVP site  NEURO: No focal findings  SKIN: Normal without suspicious lesions on exposed skin  Lab Results:     BMP:  Results from last 7 days   Lab Units 07/29/19  0301 07/28/19  1212 07/28/19  1153   POTASSIUM mmol/L 4 1 4 1  --    CHLORIDE mmol/L 103 103  --    CO2 mmol/L 28 24  --    CO2, I-STAT mmol/L  --   --  21   BUN mg/dL 19 21  --    CREATININE mg/dL 1 40* 1 40*  --    GLUCOSE, ISTAT mg/dl  --   --  148*   CALCIUM mg/dL 7 8* 8 4  --        CBC:   Results from last 7 days   Lab Units 07/29/19  0301 07/28/19  1212 07/28/19  1153   WBC Thousand/uL 11 17* 7 68  --    HEMOGLOBIN g/dL 11 6* 12 8  --    I STAT HEMOGLOBIN g/dl  --   --  12 6   HEMATOCRIT % 35 7* 38 9  --    HEMATOCRIT, ISTAT %  --   --  37   MCV fL 93 91  --    PLATELETS Thousands/uL 280 355  --    MCH pg 30 2 29 8  --    MCHC g/dL 32 5 32 9  --    RDW % 12 1 11 8  --    MPV fL 10 5 11 0  --    NRBC AUTO /100 WBCs  --  0  --        Results from last 7 days   Lab Units 07/28/19  1212   TROPONIN I ng/mL 0 10*                 Results from last 7 days   Lab Units 07/29/19  0301 07/28/19  1212   MAGNESIUM mg/dL 1 8 1 9       INR:   Results from last 7 days   Lab Units 07/28/19  1212   INR  1 17       Lipid Profile:   No results found for: CHOL  Lab Results   Component Value Date    HDL 33 (L) 07/28/2019     Lab Results   Component Value Date    LDLCALC 91 07/28/2019     Lab Results   Component Value Date    TRIG 93 07/28/2019         Hgb A1c:         Telemetry: personally reviewed, sinus rhythm with complete heart block, V paced at 60

## 2019-07-29 NOTE — CONSULTS
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' so went to ED for evaluation  Found to be in complete heart block s/p temporary femoral pacer placement  Today feels well besides back pain  Denies lightheadedness, CP, SOB  Performed bedside testing of temporary femoral pacer showing underlying rhythm at 30bpm with RBBB morphology  PMH: disc herniation  Surgical Hx: denies  FH: no FH of CAD or SCD  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, , 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,   Allergies: PCN, rash    PCP: unknown    Prior Cardiac Imaging  - none    Physical exam  Gen: anxious appearing, + fem pacer  Psych: anxious  Skin: intact  Cardiac: S1, S2, regular rate, no S3 or S4 appreciated  No murmurs  +2 PT, radial pulses  No peripheral edema No carotid bruits  Resp: CTABL  No crackles  MSK: 5/5 strength throughout muscle groups  Neuro: CN grossly intact  Sensory to light touch, pain, proprioception intact BL LE, UE  LN: no cervical LAD  Rheum: no joint deformities in UE or LE    A&P   39yo man with no significant cardiac history found to be in complete heart block s/p femoral pacer    1  Complete heart block  - s/p temporary femoral pacer  Need exchange for permanent temporary pacer  Will communicate with anaesthesia concerning timing  To be performed today or tomorrow  - f/u lyme titers   If negative will pursue workup for other less likely etiologies  - underlying rhythm appears to be RBBB  - today (7/29/19) is Ceftriaxone day 2 of planned 28 day course    2  Acute kidney injury likely due to decreased PO intake: trend    3  VTE ppx: Lovenox 40mg daily    Please await attending physician final attestation  Ruchi Parks MD  - PGY-4 Cardiology Fellow  - Pager: [phone]    Review of Systems  ROS as noted above, otherwise 12 point review of systems was performed and is negative  Historical Information   Past Medical History:   Diagnosis Date    Arthritis     Back pain     Cancer ([address] )     skin cancer on face    DDD (degenerative disc disease), lumbar     Foot pain, left     History of herniated intervertebral disc     x 6    Obesity     Scratches     Hands    Seasonal allergies     Snores      Past Surgical History:   Procedure Laterality Date    AR ANKLE SCOPE,PLANTAR FASCIOTOMY Left 12/26/2018    Procedure: RELEASE FASCIA PLANTAR/FASCIOTOMY ENDOSCOPIC (EPF);   Surgeon: Truong Shanks DPM;  Location: [facility];  Service: Podiatry    SEPTOPLASTY      SKIN CANCER EXCISION      Facial- by nose    VASECTOMY       Social History     Substance and Sexual Activity   Alcohol Use Yes    Comment: Rarely- 3 monthly     Social History     Substance and Sexual Activity   Drug Use No     Social History     Tobacco Use   Smoking Status Former Smoker    Years: 2 00   Smokeless Tobacco Never Used     Family History: non-contributory    Meds/Allergies   Hospital Medications:   Current Facility-Administered Medications   Medication Dose Route Frequency    acetaminophen (TYLENOL) tablet 650 mg  650 mg Oral Q4H PRN    baclofen tablet 10 mg  10 mg Oral TID    cefTRIAXone (ROCEPHIN) 2,000 mg in dextrose 5 % 50 mL IVPB  2,000 mg Intravenous Q24H    [START ON 7/30/2019] enoxaparin (LOVENOX) subcutaneous injection 40 mg  40 mg Subcutaneous Daily    HYDROmorphone (DILAUDID) injection 1 mg  1 mg Intravenous Q4H PRN    melatonin tablet 3 mg  3 mg Oral HS    oxyCODONE (ROXICODONE) immediate release tablet 10 mg  10 mg Oral Q4H PRN    oxyCODONE (ROXICODONE) IR tablet 5 mg  5 mg Oral Q4H PRN    sodium chloride (PF) 0 9 % injection 3 mL  3 mL Intravenous PRN    sodium chloride 0 9 % infusion  100 mL/hr Intravenous Continuous     Home Medications:   Medications Prior to Admission   Medication    acetaminophen (TYLENOL) 500 mg tablet    ibuprofen (MOTRIN) 600 mg tablet       Allergies   Allergen Reactions    Penicillins Rash     Childhood       Objective   Vitals: Blood pressure 110/61, pulse 58, temperature 98 3 °F (36 8 °C), temperature source Oral, resp  rate 14, height 6' 2" (1 88 m), weight 121 kg (267 lb 6 7 oz), SpO2 96 %  Orthostatic Blood Pressures      Most Recent Value   Blood Pressure  110/61 filed at 07/29/2019 1300   Patient Position - Orthostatic VS  Lying filed at 07/29/2019 1210          No intake or output data in the 24 hours ending 07/29/19 1348    Invasive Devices     Peripheral Intravenous Line            Peripheral IV 07/28/19 Right Antecubital 1 day    Peripheral IV 07/28/19 Left Antecubital less than 1 day          Line            Pacer Wires 1 day    Venous Sheath 6 Fr  Right Femoral 1 day          Airway            Supraglottic Airway LMA 4 215 days                Physical Exam    Lab Results: I have personally reviewed pertinent lab results      Results from last 7 days   Lab Units 07/29/19  0301 07/28/19 1212 07/28/19  1153   WBC Thousand/uL 11 17* 7 68  --    HEMOGLOBIN g/dL 11 6* 12 8  --    I STAT HEMOGLOBIN g/dl  --   --  12 6   HEMATOCRIT % 35 7* 38 9  --    HEMATOCRIT, ISTAT %  --   --  37   PLATELETS Thousands/uL 280 355  --      Results from last 7 days   Lab Units 07/29/19  0301 07/28/19  1212 07/28/19  1153   POTASSIUM mmol/L 4 1 4 1  --    CHLORIDE mmol/L 103 103  --    CO2 mmol/L 28 24  --    CO2, I-STAT mmol/L  --   --  21   BUN mg/dL 19 21  --    CREATININE mg/dL 1 40* 1 40*  --    GLUCOSE, ISTAT mg/dl  --   --  148*   CALCIUM mg/dL 7 8* 8 4  --      Results from last 7 days   Lab Units 19  1212   INR  1 17   PTT seconds 37     Results from last 7 days   Lab Units 19  0301 19  1212   MAGNESIUM mg/dL 1 8 1 9       Imaging: I have personally reviewed pertinent reports  ECHO:   Results for orders placed during the hospital encounter of 19   Echo Complete with Contrast if Indicated    Narrative [address], [address] [facility]  [phone]    Transthoracic Echocardiogram  2D, M-mode, Doppler, and Color Doppler    Study date:  2019    Patient: Alex Longoria  MR number: [id number]  Account number: [de-identified]  : 1980  Age: 37 years  Gender: Male  Status: Inpatient  Location: Bedside  Height: 74 in  Weight: 265 5 lb  BP: 102/ 68 mmHg    Indications: Syncope  Diagnoses: R55  - Syncope and collapse    Sonographer:  Crow Keating RDCS  Primary Physician:  Caorla Briseno MD  Referring Physician:  Raina Jara MD  Group:  [facility]  Interpreting Physician:  Lissette Linder MD    SUMMARY    LEFT VENTRICLE:  Systolic function was normal  Ejection fraction was estimated to be 60 %  Although no diagnostic regional wall motion abnormality was identified, this possibility cannot be completely excluded on the basis of this study  Wall thickness was mildly increased  VENTRICULAR SEPTUM:  There was dyssynergic motion  These changes are consistent with a conduction abnormality or paced rhythm  HISTORY: PRIOR HISTORY: Complete heart block  XIOMARA  Former smoker  PROCEDURE: The procedure was performed at the bedside  This was a routine study  The transthoracic approach was used  The study included complete 2D imaging, M-mode, complete spectral Doppler, and color Doppler  The heart rate was 59 bpm,  at the start of the study  Intravenous contrast (  8 mL of definity in NSS) was administered to opacify the left ventricle   Echocardiographic views were limited due to restricted patient mobility, poor acoustic window availability, and  lung interference  This was a technically difficult study  LEFT VENTRICLE: Size was normal  Systolic function was normal  Ejection fraction was estimated to be 60 %  Although no diagnostic regional wall motion abnormality was identified, this possibility cannot be completely excluded on the basis  of this study  Wall thickness was mildly increased  DOPPLER: The transmitral flow pattern was abnormal     VENTRICULAR SEPTUM: There was dyssynergic motion  These changes are consistent with a conduction abnormality or paced rhythm  RIGHT VENTRICLE: The size was normal  Systolic function was normal  A pacing wire was present  Not well visualized  LEFT ATRIUM: Size was normal  Not well visualized  RIGHT ATRIUM: Size was normal  Not well visualized  MITRAL VALVE: Valve structure was normal  There was normal leaflet separation  DOPPLER: There was no evidence for stenosis  There was mild regurgitation  AORTIC VALVE: The valve was probably trileaflet  Leaflets exhibited normal cuspal separation  The valve was not well visualized  DOPPLER: There was no evidence for stenosis  There was no significant regurgitation  TRICUSPID VALVE: The valve structure was normal  There was normal leaflet separation  DOPPLER: There was no evidence for stenosis  There was mild regurgitation  Estimated peak PA pressure was 20 mmHg  PULMONIC VALVE: Leaflets exhibited normal thickness, no calcification, and normal cuspal separation  DOPPLER: The transpulmonic velocity was within the normal range  There was trace regurgitation  PERICARDIUM: There was no pericardial effusion  A pericardial fat pad was present  The pericardium was normal in appearance  AORTA: The root exhibited normal size  SYSTEMIC VEINS: IVC: The inferior vena cava was dilated  The respirophasic change in diameter was less than 50%      SYSTEM MEASUREMENT TABLES    2D  %FS: 42 1 %  Ao Diam: 3 69 cm  EDV(Teich): 111 55 ml  EF(Teich): 72 96 %  ESV(Teich): 30 16 ml  IVSd: 1 3 cm  LA Area: 26 56 cm2  LA Diam: 3 55 cm  LVEDV MOD A4C: 134 93 ml  LVEF MOD A4C: 60 59 %  LVESV MOD A4C: 53 17 ml  LVIDd: 4 88 cm  LVIDs: 2 82 cm  LVLd A4C: 9 03 cm  LVLs A4C: 6 91 cm  LVPWd: 1 17 cm  RA Area: 20 63 cm2  RVIDd: 4 35 cm  SV MOD A4C: 81 75 ml  SV(Teich): 81 39 ml    CW  TR MaxP 4 mmHg  TR Vmax: 2 14 m/s    MM  TAPSE: 1 65 cm    [facility] Accredited Echocardiography Laboratory    Prepared and electronically signed by    Yovana Conte MD  Signed 2019 13:16:22

## 2019-07-29 NOTE — LETTER
[address] [facility]  [address] [address]  Dept: [phone]    August 1, 2019     Patient: Miguel Canales   YOB: 1980   Date of Visit: 7/29/2019       To Whom it May Concern:    Miguel Canales is under my professional care  He was seen in the hospital from 7/29/2019   to 08/01/19  He may return to work on 08/08/2019 with the following limitations Can resume activity as tolerated once cleared by primary care provider       If you have any questions or concerns, please don't hesitate to call           Sincerely,          Marisel Brian, DO

## 2019-07-29 NOTE — PROGRESS NOTES
Pr arrived via ems from [facility]  On arrival, VS Stable 's/70s V-Paced at 60  Only complaint severe lower back pain ongoing  Pt reports 8/10 back pain from lying flat secondary to herniated disks  Repositioned, applied heating pad  R groin Sheath with TVP wires intact, no drainage soft  Made Eduardo Showers aware of pt's arrival   Dr Caal Batters in to see and assess pt  Provided emotional support  EP Cards consulted  15:00 Dr Arsh Poe in to see pt  TVP rate 60 AMP 5  Plan is Temp Permanent Venous Pacemaker in the morning  Will continue to monitor

## 2019-07-29 NOTE — PLAN OF CARE
Problem: Prexisting or High Potential for Compromised Skin Integrity  Goal: Skin integrity is maintained or improved  Description  INTERVENTIONS:  - Identify patients at risk for skin breakdown  - Assess and monitor skin integrity  - Assess and monitor nutrition and hydration status  - Monitor labs (i e  albumin)  - Assess for incontinence   - Turn and reposition patient  - Assist with mobility/ambulation  - Relieve pressure over bony prominences  - Avoid friction and shearing  - Provide appropriate hygiene as needed including keeping skin clean and dry  - Evaluate need for skin moisturizer/barrier cream  - Collaborate with interdisciplinary team (i e  Nutrition, Rehabilitation, etc )   - Patient/family teaching  Outcome: Progressing     Problem: CARDIOVASCULAR - ADULT  Goal: Maintains optimal cardiac output and hemodynamic stability  Description  INTERVENTIONS:  - Monitor I/O, vital signs and rhythm  - Monitor for S/S and trends of decreased cardiac output i e  bleeding, hypotension  - Administer and titrate ordered vasoactive medications to optimize hemodynamic stability  - Assess quality of pulses, skin color and temperature  - Assess for signs of decreased coronary artery perfusion - ex   Angina  - Instruct patient to report change in severity of symptoms  Outcome: Progressing  Goal: Absence of cardiac dysrhythmias or at baseline rhythm  Description  INTERVENTIONS:  - Continuous cardiac monitoring, monitor vital signs, obtain 12 lead EKG if indicated  - Administer antiarrhythmic and heart rate control medications as ordered  - Monitor electrolytes and administer replacement therapy as ordered  Outcome: Progressing     Problem: PAIN - ADULT  Goal: Verbalizes/displays adequate comfort level or baseline comfort level  Description  Interventions:  - Encourage patient to monitor pain and request assistance  - Assess pain using appropriate pain scale  - Administer analgesics based on type and severity of pain and evaluate response  - Implement non-pharmacological measures as appropriate and evaluate response  - Consider cultural and social influences on pain and pain management  - Notify physician/advanced practitioner if interventions unsuccessful or patient reports new pain  Outcome: Progressing     Problem: INFECTION - ADULT  Goal: Absence or prevention of progression during hospitalization  Description  INTERVENTIONS:  - Assess and monitor for signs and symptoms of infection  - Monitor lab/diagnostic results  - Monitor all insertion sites, i e  indwelling lines, tubes, and drains  - Monitor endotracheal (as able) and nasal secretions for changes in amount and color  - [place] appropriate cooling/warming therapies per order  - Administer medications as ordered  - Instruct and encourage patient and family to use good hand hygiene technique  - Identify and instruct in appropriate isolation precautions for identified infection/condition  Outcome: Progressing     Problem: SAFETY ADULT  Goal: Patient will remain free of falls  Description  INTERVENTIONS:  - Assess patient frequently for physical needs  -  Identify cognitive and physical deficits and behaviors that affect risk of falls    -  [place] fall precautions as indicated by assessment   - Educate patient/family on patient safety including physical limitations  - Instruct patient to call for assistance with activity based on assessment  - Modify environment to reduce risk of injury  - Consider OT/PT consult to assist with strengthening/mobility  Outcome: Progressing  Goal: Maintain or return to baseline ADL function  Description  INTERVENTIONS:  -  Assess patient's ability to carry out ADLs; assess patient's baseline for ADL function and identify physical deficits which impact ability to perform ADLs (bathing, care of mouth/teeth, toileting, grooming, dressing, etc )  - Assess/evaluate cause of self-care deficits   - Assess range of motion  - Assess patient's mobility; develop plan if impaired  - Assess patient's need for assistive devices and provide as appropriate  - Encourage maximum independence but intervene and supervise when necessary  ¯ Involve family in performance of ADLs  ¯ Assess for home care needs following discharge   ¯ Request OT consult to assist with ADL evaluation and planning for discharge  ¯ Provide patient education as appropriate  Outcome: Progressing  Goal: Maintain or return mobility status to optimal level  Description  INTERVENTIONS:  - Assess patient's baseline mobility status (ambulation, transfers, stairs, etc )    - Identify cognitive and physical deficits and behaviors that affect mobility  - Identify mobility aids required to assist with transfers and/or ambulation (gait belt, sit-to-stand, lift, walker, cane, etc )  - [place] fall precautions as indicated by assessment  - Record patient progress and toleration of activity level on Mobility SBAR; progress patient to next Phase/Stage  - Instruct patient to call for assistance with activity based on assessment  - Request Rehabilitation consult to assist with strengthening/weightbearing, etc   Outcome: Progressing     Problem: DISCHARGE PLANNING  Goal: Discharge to home or other facility with appropriate resources  Description  INTERVENTIONS:  - Identify barriers to discharge w/patient and caregiver  - Arrange for needed discharge resources and transportation as appropriate  - Identify discharge learning needs (meds, wound care, etc )  - Arrange for interpretive services to assist at discharge as needed  - Refer to Case Management Department for coordinating discharge planning if the patient needs post-hospital services based on physician/advanced practitioner order or complex needs related to functional status, cognitive ability, or social support system  Outcome: Progressing     Problem: Knowledge Deficit  Goal: Patient/family/caregiver demonstrates understanding of disease process, treatment plan, medications, and discharge instructions  Description  Complete learning assessment and assess knowledge base    Interventions:  - Provide teaching at level of understanding  - Provide teaching via preferred learning methods  Outcome: Progressing

## 2019-07-29 NOTE — PROGRESS NOTES
Progress Note - Infectious Disease   Amberly Griffin 37 y o  male MRN: [id number]  Unit/Bed#: [facility] 515-01 Encounter: [id number]      Impression/Plan:  1  Complete heart block  Consider due to Lyme disease  Cardiac catheterization unremarkable  Rec:  ? Continues ceftriaxone 2 g IV Q 24 hours  ? Check Lyme antibody screen with reflex to Western blot  ? Temporary pacemaker per Cardiology  ? Being transferred to [place] as below for pacemaker site adjustments     2  Probable Lyme disease  In setting of # 1  Rec:  ? Continue antibiotics as above  ? Check Lyme antibody screen as above     Discussed the above impression and plan in detail with the patient and wife at bedside  Reassurance given  3  XIOMARA  Creatine 1 4  Unclear baseline  Unchanged thus far  Rec:  ? Continue antibiotics as above, no renal adjustment required  ? Monitor Creatine    Antibiotics:  Ceftriaxone day 2    Subjective:  Patient has no fever, chills, sweats; no nausea, vomiting, diarrhea; no cough, shortness of breath;   + worse on chronic lumbar back pain with having to lay flat  Patient being transferred to [place] in effort to change external Pacer location given his back pain  No BM yet  Objective:  Vitals:  Temp:  [98 3 °F (36 8 °C)-101 5 °F (38 6 °C)] 98 3 °F (36 8 °C)  HR:  [58-60] 58  Resp:  [13-25] 14  BP: ()/(50-79) 110/61  SpO2:  [96 %-100 %] 96 %  Temp (24hrs), Av 4 °F (37 4 °C), Min:98 3 °F (36 8 °C), Max:101 5 °F (38 6 °C)  Current: Temperature: 98 3 °F (36 8 °C)    Physical Exam:   General Appearance:  Alert, interactive, nontoxic, no acute distress  Laying flat in bed  Throat: Oropharynx moist without lesions  Lungs:   Clear to auscultation bilaterally; no wheezes, rhonchi or rales; respirations unlabored   Heart:  Paced RRR; no murmur   Abdomen:   Soft, non-tender, non-distended, positive bowel sounds       Extremities: No clubbing or cyanosis, no edema, IV site nontender   Skin: No new rashes or lesions  Labs, Imaging, & Other studies:   All pertinent labs and imaging studies were personally reviewed  Results from last 7 days   Lab Units 07/29/19  0301 07/28/19  1212 07/28/19  1153   WBC Thousand/uL 11 17* 7 68  --    HEMOGLOBIN g/dL 11 6* 12 8  --    I STAT HEMOGLOBIN g/dl  --   --  12 6   PLATELETS Thousands/uL 280 355  --      Results from last 7 days   Lab Units 07/29/19  0301  07/28/19  1153   POTASSIUM mmol/L 4 1   < >  --    CHLORIDE mmol/L 103   < >  --    CO2 mmol/L 28   < >  --    CO2, I-STAT mmol/L  --   --  21   BUN mg/dL 19   < >  --    CREATININE mg/dL 1 40*   < >  --    EGFR ml/min/1 73sq m 62   < > 62   GLUCOSE, ISTAT mg/dl  --   --  148*   CALCIUM mg/dL 7 8*   < >  --     < > = values in this interval not displayed

## 2019-07-29 NOTE — DISCHARGE SUMMARY
Discharge Summary - Yessenia Montes 37 y o  male MRN: [id number]    Unit/Bed#:  Encounter: [id number]    Admission Date:   Admission Orders (From admission, onward)     Ordered        07/28/19 1410  Inpatient Admission  Once                     Admitting Diagnosis: Complete heart block ([address] ) [I44 2]  Chest pain [R07 9]  ST elevation [R94 31]  Elevated troponin I level [R74 8]    HPI: [employer] is a pleasant 40 yo male with PMH of herniated discs with chronic LBP who presented to the ER complaining of dizziness, near syncope, and mild chest pain that started mid afternoon the day prior to admission  Procedures Performed:   Orders Placed This Encounter   Procedures    Cardiac catheterization    Critical Care    ED ECG Documentation Only    ED ECG Documentation Only    ED ECG Documentation Only       Summary of Hospital Course: The patient states he has been more tired recently, but attributed that to working 7 days per week at his [employer]  The day prior to admission, he felt lightheaded and weak  He tried to carry his 10 yo daughter up the stairs and had to stop mid step to set her down, as he felt like he was going to pass out  He then went upstairs and laid down  During the course of the night, into the next morning, his symptoms worsened and he started to feel chest pain  He states it wasn't severe, but he felt like something was wrong  He was mildly SOB  He came into the ER and was noted to be in complete heart block with a rate in the 30's  His blood pressure was normotensive and vital signs otherwise normal  His first troponin was 0 1 with abnormal ST segments  He was emergently taken to the cath lab by Dr Eve Lao  The coronary arteries were patent and Dr Eve Lao felt that the CHB may be caused by lyme disease  He placed a temporary pacemaker via the right groin   He spoke with ID and started the patient on Rocephin 2 grams daily, which was started yesterday after lyme titers were sent  The patient denies any known tick bites, rashes, fevers, or flu like symptoms recently  He does do some work outside and has a dog that he takes for walks outside, so he does have risk factors  Overnight, the patient did spike a temp of 101 5 and was complaining of right groin pain and chest pain  He was assessed and there were no concerns on exam at the right groin  He was given IV dilaudid with resolution of symptoms  Overnight and currently, he is almost 100% paced  He is also on NSS @ 100 cc/hr for an elevated Cr of 1 4  He was evaluated today by Cardiology who would like him transferred to [place] for a possible temporary vs permanent pacemaker  EP was made aware and the ICU team accepted the patient for transport  Prior to transport, a bedside TTE was completed  Significant Findings, Care, Treatment and Services Provided: Temporary pacemaker placed 9/66/66    Complications: None    Discharge Diagnosis: Complete Heart Block, Possible XIOMARA, Possible Lyme Disease    Resolved Problems  Date Reviewed: 7/29/2019          Resolved    Anemia 7/29/2019     Resolved by  Geronimo De Oliveira PA-C          Condition at Discharge: stable         Discharge instructions/Information to patient and family:   See after visit summary for information provided to patient and family  Provisions for Follow-Up Care:  See after visit summary for information related to follow-up care and any pertinent home health orders  PCP: Miguel Angel Mao MD    Disposition: [address]    Planned Readmission: Yes [facility]    Discharge Statement   I spent 35 minutes discharging the patient  This time was spent on the day of discharge  I had direct contact with the patient on the day of discharge  Additional documentation is required if more than 30 minutes were spent on discharge  Discharge Medications:  See after visit summary for reconciled discharge medications provided to patient and family

## 2019-07-29 NOTE — PROGRESS NOTES
Progress Note - Critical Care   Laxmi Eng 37 y o  male MRN: [id number]  Unit/Bed#:  Encounter: [id number]    Assessment:   Principal Problem:    Complete heart block (HCC)  Active Problems:    XIOMARA (acute kidney injury) ([address] )  Resolved Problems:    * No resolved hospital problems  *      Plan  1  Neuro  ·  Cont to monitor neuro status  2  CV  ·  CHB- Temporary pacer via right groin with rate set at 60  Still paced majority of rate  Hemodynamically stable with clean cath completed yesterday by Dr Bob Rose  Cont tele and Rocephin #2 for presumed lyme disease  Titers sent and ID following, cont to monitor  TSH is 2 059  Will discuss with Cardiology possible vascular study to r/o pseudoaneurysm given complaint of right groin pain  3  Pulm  · Stable- continue to monitor  4  GI  · Cont cardiac diet  5    · Cont to monitor  Voiding without concerns  6  Endocrine  · Stable  7  ID  · ID follows  Cont Rocephin #2 for presumed lyme and await titer results  8  Renal  · Cr remains 1 4  Cont to monitor, would give IVF today and repeat BMP in am     VTE Prophylaxis:  · Pharmacologic Prophylaxis:Enoxaparin (Lovenox)  · Mechanical Prophylaxis: sequential compression device    Continue management as per primary team   Critical Care will continue to follow along       ______________________________________________________________________  Chief Complaint: Weakness      HPI/24hr events: Cheyenne Perez is resting comfortably this morning  Earlier this morning he complained of 8/10 chest discomfort and right groin pain  He was slightly diaphoretic per report  Labs were checked and the patient was given 1 mg of IV dilaudid with resolution of pain  He was not SOB during this event  His groin is TTP at the insertion site of temp pacer  He is not complaining of pain at the site, unless it is palpated  Review of Systems   Constitutional: Positive for fever  Negative for chills and diaphoresis     Respiratory: Negative for cough, chest tightness and shortness of breath  Cardiovascular: Negative for chest pain, palpitations and leg swelling  Gastrointestinal: Negative for abdominal distention, abdominal pain, nausea and vomiting      ______________________________________________________________________  Temperature:   Temp (24hrs), Av 5 °F (37 5 °C), Min:98 3 °F (36 8 °C), Max:101 5 °F (38 6 °C)    Current Temperature: 99 1 °F (37 3 °C)    Vitals:    19 0255 19 0300 19 0400 19 0500   BP: 99/64 108/65 95/51 97/58   Pulse: 59 59 59 59   Resp: 20 20 13 13   Temp: 98 8 °F (37 1 °C) 99 1 °F (37 3 °C)     TempSrc: Oral Oral     SpO2: 99% 98% 98% 98%   Weight:    121 kg (266 lb 12 1 oz)              Weights:   IBW: -88 kg    Body mass index is 34 25 kg/m²  Weight (last 2 days)     Date/Time   Weight    19 0500   121 (266 76)    19 1149   122 (268 52)               Hemodynamic Monitoring:  N/A     Noninvasive/Ventilator Settings:  Ventilator Settings:       Settings                     No results found for: PHART, LQQ0IRH, PO2ART, TLU1DKE, S9XQHMSQ, BEART, SOURCE  SpO2: SpO2: 98 %  ______________________________________________________________________  Physical Exam:     Physical Exam   Constitutional: He is oriented to person, place, and time  He appears well-developed and well-nourished  HENT:   Head: Normocephalic and atraumatic  Eyes: Pupils are equal, round, and reactive to light  Neck: Normal range of motion  Neck supple  Cardiovascular: Normal rate, regular rhythm and normal heart sounds  No murmur heard  Pulses:       Popliteal pulses are 3+ on the right side  Dorsalis pedis pulses are 3+ on the right side  Posterior tibial pulses are 3+ on the right side  No hematoma appreciated in right groin  Site clean, no erythema  TTP palpation  Pulmonary/Chest: Effort normal and breath sounds normal  He has no wheezes  Abdominal: Soft   Bowel sounds are normal  He exhibits no distension  There is no tenderness  Musculoskeletal: He exhibits no edema  Neurological: He is alert and oriented to person, place, and time  Skin: Skin is warm and dry  Nursing note and vitals reviewed  ______________________________________________________________________  Intake and Outputs:  I/O       07/27 0701 - 07/28 0700 07/28 0701 - 07/29 0700 07/29 0701 - 07/30 0700    I V  (mL/kg)  540 5 (4 5)     IV Piggyback  50     Total Intake(mL/kg)  590 5 (4 9)     Urine (mL/kg/hr)  400     Total Output  400     Net  +190 5                Nutrition:        Diet Orders   (From admission, onward)             Start     Ordered    07/28/19 1504  Room Service  Once     Question:  Type of Service  Answer:  Room Service - Appropriate with Assistance    07/28/19 1503    07/28/19 1407  Diet Regular; Regular House  Diet effective now     Question Answer Comment   Diet Type Regular    Regular Regular House    RD to adjust diet per protocol?  Yes        07/28/19 1413                Labs:   Results from last 7 days   Lab Units 07/29/19  0301 07/28/19  1212 07/28/19  1153   WBC Thousand/uL 11 17* 7 68  --    HEMOGLOBIN g/dL 11 6* 12 8  --    I STAT HEMOGLOBIN g/dl  --   --  12 6   HEMATOCRIT % 35 7* 38 9  --    HEMATOCRIT, ISTAT %  --   --  37   PLATELETS Thousands/uL 280 355  --    NEUTROS PCT %  --  72  --    MONOS PCT %  --  8  --     Results from last 7 days   Lab Units 07/29/19  0301 07/28/19  1212 07/28/19  1153   SODIUM mmol/L 138 138  --    POTASSIUM mmol/L 4 1 4 1  --    CHLORIDE mmol/L 103 103  --    CO2 mmol/L 28 24  --    CO2, I-STAT mmol/L  --   --  21   BUN mg/dL 19 21  --    CREATININE mg/dL 1 40* 1 40*  --    CALCIUM mg/dL 7 8* 8 4  --    GLUCOSE RANDOM mg/dL 113 141*  --      Results from last 7 days   Lab Units 07/29/19  0301 07/28/19  1212   MAGNESIUM mg/dL 1 8 1 9          Results from last 7 days   Lab Units 07/28/19  1212   INR  1 17   PTT seconds 37         0   Lab Value Date/Time TROPONINI 0 10 (H) 07/28/2019 1212     Imaging:  I have personally reviewed pertinent films in PACS    Micro:  No results found for: Padmacheryle Lewis SPUTUMCULTUR  Allergies: Allergies   Allergen Reactions    Penicillins Rash     Childhood     Medications:   Scheduled Meds:  Current Facility-Administered Medications:  acetaminophen 650 mg Oral Q4H PRN Fermin Formica, CRNP    cefTRIAXone 2,000 mg Intravenous Q24H Yuliya Tom MD Last Rate: Stopped (07/28/19 1512)   enoxaparin 40 mg Subcutaneous Daily Yuliya Tom MD    melatonin 3 mg Oral HS Vandana Damon PA-C    oxyCODONE 5 mg Oral Q4H PRN Yuliya Tom MD    sodium chloride (PF) 3 mL Intravenous PRN Endy Sanders DO      Continuous Infusions:   PRN Meds:    acetaminophen 650 mg Q4H PRN   oxyCODONE 5 mg Q4H PRN   sodium chloride (PF) 3 mL PRN       Invasive lines and devices: Invasive Devices     Peripheral Intravenous Line            Peripheral IV 07/28/19 Left Antecubital less than 1 day    Peripheral IV 07/28/19 Right Antecubital less than 1 day          Line            Pacer Wires less than 1 day    Venous Sheath 6 Fr  Right Femoral less than 1 day          Airway            Supraglottic Airway LMA 4 214 days                   Counseling / Coordination of Care  Total Critical Care time spent 35 minutes excluding procedures, teaching and family updates  Code Status: Level 1 - Full Code    Portions of the record may have been created with voice recognition software  Occasional wrong word or "sound a like" substitutions may have occurred due to the inherent limitations of voice recognition software  Read the chart carefully and recognize, using context, where substitutions have occurred      Aldair Obrien PA-C

## 2019-07-29 NOTE — H&P
History and Physical - Critical Care   Yessenia Montes 37 y o  male MRN: [id number]  Unit/Bed#: [facility] 226-83 Encounter: [id number]    Reason for Admission / Chief Complaint:  Shortness of breath/weakness/palpitations    History of Present Illness:  Yessenia Montes is a 37 y o  male who presented to [facility] after feeling more tired, shortness of breath, lightheadedness, weakness  Patient states that his symptoms worsened over the course of 2 days along with worsening chest pain  On initial ear presentation he was noted to be in complete heart block in the 30s  Blood pressure was normotensive along with normal vital signs  1st troponin was 0 1 with abnormal ST segments, repeat EKG showed ST elevations  Patient was taken emergently to the cath but which demonstrated normal coronary arteries  Patient was evaluated by Cardiology who spoke with electrophysiology and felt more comfortable with him being monitored at [place] for possible temporary versus permanent pacemaker  Bedside TTE was completed prior to transfer  History obtained from chart review and the patient   ______________________________________________________________________    Assessment:   Principal Problem:    Complete heart block (HCC)  Active Problems:    Chronic midline low back pain without sciatica    Lyme carditis  Resolved Problems:    * No resolved hospital problems  *        Plan:    Neuro:  No acute issues   - continue to monitor nerve exam    CV:  Complete heart block   - temporary pacer wires in right groin with rate set at 60     - EP consulted   - plan for temp/perm tomorrow a m    - continue to monitor right groin possibility of pseudoaneurysm   - monitor hemoglobin levels     Pulm:  Stable, no acute issues    GI:  Cardiac diet   - NPO at midnight    :  Urinary retention   - patient with minimal urine output   - continue to monitor I&O  XIOMARA   - continue to monitor BUN and creatinine   - fluid hydration    F/E/N: NaCl 100 mL/hour   - replete electrolytes   - regular diet    ID:  Lyme carditis   - Rocephin 2 g Q 12   - ID following    Heme:  Continue to monitor hemoglobin   -potential for pseudoaneurysm at catheter insertion site    Endo:  No acute issues    Msk/Skin:  Bed rest due to right groin catheter    Disposition:  ICU      Counseling / Coordination of Care  Total Critical Care time spent 20 minutes excluding procedures, teaching and family updates  ______________________________________________________________________  Past Medical History:  Past Medical History:   Diagnosis Date    Arthritis     Back pain     Cancer ([address] )     skin cancer on face    DDD (degenerative disc disease), lumbar     Foot pain, left     History of herniated intervertebral disc     x 6    Obesity     Scratches     Hands    Seasonal allergies     Snores        Past Surgical History:  Past Surgical History:   Procedure Laterality Date    OH ANKLE SCOPE,PLANTAR FASCIOTOMY Left 12/26/2018    Procedure: RELEASE FASCIA PLANTAR/FASCIOTOMY ENDOSCOPIC (EPF); Surgeon: Lena Barnard DPM;  Location: [facility];  Service: Podiatry    SEPTOPLASTY      SKIN CANCER EXCISION      Facial- by nose    VASECTOMY         Past Family History:  No family history on file      Social History:  Social History     Tobacco Use   Smoking Status Former Smoker    Years: 2 00   Smokeless Tobacco Never Used     Social History     Substance and Sexual Activity   Alcohol Use Yes    Comment: Rarely- 3 monthly     Social History     Substance and Sexual Activity   Drug Use No     Marital Status: /Civil Union    Medications:  Current Facility-Administered Medications   Medication Dose Route Frequency    acetaminophen (TYLENOL) tablet 650 mg  650 mg Oral Q4H PRN    baclofen tablet 10 mg  10 mg Oral TID    cefTRIAXone (ROCEPHIN) 2,000 mg in dextrose 5 % 50 mL IVPB  2,000 mg Intravenous Q24H    [START ON 7/30/2019] enoxaparin (LOVENOX) subcutaneous injection 40 mg  40 mg Subcutaneous Daily    HYDROmorphone (DILAUDID) injection 1 mg  1 mg Intravenous Q4H PRN    melatonin tablet 3 mg  3 mg Oral HS    oxyCODONE (ROXICODONE) immediate release tablet 10 mg  10 mg Oral Q4H PRN    oxyCODONE (ROXICODONE) IR tablet 5 mg  5 mg Oral Q4H PRN    sodium chloride (PF) 0 9 % injection 3 mL  3 mL Intravenous PRN    sodium chloride 0 9 % infusion  50 mL/hr Intravenous Continuous     Home medications:  Prior to Admission medications    Medication Sig Start Date End Date Taking? Authorizing Provider   acetaminophen (TYLENOL) 500 mg tablet Take 1,000 mg by mouth every 6 (six) hours as needed for mild pain    Historical Provider, MD   ibuprofen (MOTRIN) 600 mg tablet Take by mouth every 6 (six) hours as needed for mild pain    Historical Provider, MD     Allergies: Allergies   Allergen Reactions    Penicillins Rash     Childhood       ROS:   Review of Systems    Vitals:  Vitals:    19 1210 19 1300 19 1400 19 1526   BP: 139/78 110/61 133/69    BP Location: Left arm      Pulse: 59 58 58    Resp: 18 14 14    Temp: 98 3 °F (36 8 °C)   98 °F (36 7 °C)   TempSrc: Oral   Oral   SpO2: 97% 96% 96%    Weight: 121 kg (267 lb 6 7 oz)      Height: 6' 2" (1 88 m)        Temperature:   Temp (24hrs), Av 5 °F (37 5 °C), Min:98 °F (36 7 °C), Max:101 5 °F (38 6 °C)    Current Temperature: 98 °F (36 7 °C)    Weights:   IBW: 82 2 kg  Body mass index is 34 33 kg/m²      Hemodynamic Monitoring:  N/A     Non-Invasive/Invasive Ventilation Settings:  Respiratory    Lab Data (Last 4 hours)    None         O2/Vent Data (Last 4 hours)    None              No results found for: PHART, QXQ8UTW, PO2ART, XNK5BWV, P3VKIPBX, BEART, SOURCE  SpO2: SpO2: 96 %, SpO2 Activity: SpO2 Activity: At Rest, SpO2 Device: O2 Device: Nasal cannula     Physical Exam:  Physical Exam    Labs:  Results from last 7 days   Lab Units 19  0301 19  1212 19  1153   WBC Thousand/uL 11 17* 7 68  --    HEMOGLOBIN g/dL 11 6* 12 8  --    I STAT HEMOGLOBIN g/dl  --   --  12 6   HEMATOCRIT % 35 7* 38 9  --    HEMATOCRIT, ISTAT %  --   --  37   PLATELETS Thousands/uL 280 355  --    NEUTROS PCT %  --  72  --    MONOS PCT %  --  8  --      Results from last 7 days   Lab Units 07/29/19  0301 07/28/19  1212 07/28/19  1153   SODIUM mmol/L 138 138  --    POTASSIUM mmol/L 4 1 4 1  --    CHLORIDE mmol/L 103 103  --    CO2 mmol/L 28 24  --    CO2, I-STAT mmol/L  --   --  21   AGAP mmol/L  --   --  18*   ANION GAP mmol/L 7 11  --    BUN mg/dL 19 21  --    CREATININE mg/dL 1 40* 1 40*  --    CALCIUM mg/dL 7 8* 8 4  --    GLUCOSE RANDOM mg/dL 113 141*  --      Results from last 7 days   Lab Units 07/29/19  0301 07/28/19  1212   MAGNESIUM mg/dL 1 8 1 9      Results from last 7 days   Lab Units 07/28/19  1212   INR  1 17   PTT seconds 37      Results from last 7 days   Lab Units 07/28/19  1212   TROPONIN I ng/mL 0 10*         ABG:    VBG:            Imaging: No results found  I have personally reviewed pertinent reports  EKG: Third-degree heart block This was personally reviewed by myself  Micro:          VTE Pharmacologic Prophylaxis: Heparin  VTE Mechanical Prophylaxis: sequential compression device    Invasive lines and devices: Invasive Devices     Peripheral Intravenous Line            Peripheral IV 07/28/19 Left Antecubital 1 day    Peripheral IV 07/28/19 Right Antecubital 1 day          Line            Pacer Wires 1 day    Venous Sheath 6 Fr  Right Femoral 1 day          Airway            Supraglottic Airway LMA 4 215 days                Code Status: Level 1 - Full Code  POA:    POLST:      Given critical illness, patient length of stay will require greater than two midnights  Portions of the record may have been created with voice recognition software  Occasional wrong word or "sound a like" substitutions may have occurred due to the inherent limitations of voice recognition software    Read the chart carefully and recognize, using context, where substitutions have occurred        Macey Montes PA-C

## 2019-07-29 NOTE — UTILIZATION REVIEW
Initial Clinical Review    Admission: Date/Time/Statement: Inpatient  7/28/19 @ 1355     Orders Placed This Encounter   Procedures    Inpatient Admission     Standing Status:   Standing     Number of Occurrences:   1     Order Specific Question:   Admitting Physician     Answer:   Marian López     Order Specific Question:   Level of Care     Answer:   Critical Care [15]     Order Specific Question:   Bed Type     Answer:   Logan [4]     Order Specific Question:   Estimated length of stay     Answer:   More than 2 Midnights     Order Specific Question:   Certification     Answer:   I certify that inpatient services are medically necessary for this patient for a duration of greater than two midnights  See H&P and MD Progress Notes for additional information about the patient's course of treatment  ED Arrival Information     Expected Arrival Acuity Means of Arrival Escorted By Service Admission Type    - 7/28/2019 11:37 Emergent Wheelchair Spouse Emergency Medicine Emergency    Arrival Complaint    Chest Pain/SOB        Chief Complaint   Patient presents with    Chest Pain     chest tightness, X approx 20 mins prior to arrival, + dizzy and lightheaded, fell last pm onto bed     Assessment/Plan: 37 y o  Male presents to ED from home admitted as Inpatient due to Complete heart block with concern for STEMI  He presented with profound increasing weakness, dizziness and near syncope with EKG showing complete heart block  Noted for HR=30s; SBP in the 130s  Initial TROP 0 1 with abnormal ST segments on EKG  Emergent admission to cath lab  Begin temporary pacemaker  IV pain meds/ASA  Check Lyme's titers, consult Infectious Disease  Check ECHO  Consult Cardiology      7/28/2019 Infectious Disease:  Impression/Recommendations:  1  Complete heart block  Consider due to Lyme disease  Cardiac catheterization unremarkable  Rec:  ? Start ceftriaxone 2 g IV Q 24 hours  ?  Check Lyme antibody screen with reflex to Western blot  ? Temporary pacemaker per Cardiology  ? Monitor on telemetry in ICU  2   Probable Lyme disease  In setting of # 1  Rec:  ? Start antibiotics as above  ? 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  On Ceftriaxone 2 g IV qd since 7/28/19  Lyme titers pending  Thus far no improvement seen in AV block  Echo being done at bedside  3  Non MI troponin elevation of 0 10  Likely due to Lyme carditis/CHB with bradycardia  Follow up echo  4  XIOMARA   Creatinine 1 4, unclear baseline, thus far stable       ED Triage Vitals   Temperature Pulse Respirations Blood Pressure SpO2   07/28/19 1209 07/28/19 1151 07/28/19 1151 07/28/19 1151 07/28/19 1209   99 2 °F (37 3 °C) (!) 45 (!) 24 141/74 100 %      Temp Source Heart Rate Source Patient Position - Orthostatic VS BP Location FiO2 (%)   07/28/19 1209 07/28/19 1209 07/28/19 1209 07/28/19 1225 --   Oral Monitor Lying Right arm       Pain Score       07/28/19 1218       4        Wt Readings from Last 1 Encounters:   07/29/19 121 kg (267 lb 6 7 oz)     Additional Vital Signs:   07/29/19 1102    59  18  120/72  91  98 %       07/29/19 1100    59  17      98 %       07/29/19 1000    59  15  118/69  86  98 %       07/29/19 0900    59  13  113/70  86  98 %       07/29/19 0800    59  17  110/68  83  98 %       07/29/19 0700  98 3 °F (36 8 °C)  60  13  102/68  80  98 %       07/29/19 0500    59  13  97/58  71 98 %       07/29/19 0300  99 1 °F (37 3 °C)  59  20  108/65  82  98 %       07/29/19 0255  98 8 °F (37 1 °C)  59  20  99/64  77  99 %  Nasal cannula     07/29/19 0200    59  19  97/56  70  99 %       07/29/19 0100    59  20  94/54  67  99 %       07/29/19 0000    59  22  94/50  67  99 %       07/28/19 2352  98 9 °F (37 2 °C)                 07/28/19 2300    59  22  98/54  72  98 %       07/28/19 2211  101 5 °F (38 6 °C)Abnormal   60  22  103/51  73  98 %  None (Room air)  Lying   07/28/19 2200    59  22  101/53  72  98 %       07/28/19 2133  101 1 °F (38 4 °C)Abnormal   59  21      98 %       07/28/19 2108  101 1 °F (38 4 °C)Abnormal                  07/28/19 2003  99 9 °F (37 7 °C)                 07/28/19 2000    59  14  123/64  84  99 %       07/28/19 1900  100 2 °F (37 9 °C)  59  22  132/69  93  100 %       07/28/19 1859  98 8 °F (37 1 °C)                 07/28/19 1845    59  22  129/73  94  100 %  None (Room air)  Lying   07/28/19 1600    59  18  121/79  94  100 %  Nasal cannula  Lying   07/28/19 1530    59  21  122/78  92  100 %       07/28/19 1521  98 3 °F (36 8 °C)  59  20  123/77  96  100 %  Nasal cannula  Lying   07/28/19 1515    59  18  123/76  94  100 %       07/28/19 1500    59  18  116/77  92  100 %  Nasal cannula  Lying   07/28/19 1415  98 5 °F (36 9 °C)  59  25Abnormal   117/73  89  99 %  Nasal cannula  Lying   07/28/19 1400  98 3 °F (36 8 °C)  59  19  121/73  89  99 %  Nasal cannula  Lying   07/28/19 12:25:12    40Abnormal   20  124/66    99 %  Nasal cannula  Lying   07/28/19 12:16:46    41Abnormal   16  122/69    100 %        SpO2: 2L at 07/28/19 1216   07/28/19 12:09:02  99 2 °F (37 3 °C)  43Abnormal   20      100 %    Lying   07/28/19 1151    45Abnormal   24Abnormal   141/74              Weights (last 14 days) before discharge     Date/Time  Weight   07/29/19 0500  121 kg (266 lb 12 1 oz)   07/28/19 1149  122 kg (268 lb 8 3 oz)       Pertinent Labs/Diagnostic Test Results:   7/28 ekg: Sinus tachycardia with complete heart block  Left axis deviation  Right bundle branch block  T wave abnormality, consider inferolateral ischemia  Abnormal ECG  7/28/2019 CARDIAC cath:  CORONARY CIRCULATION:  Left main: Normal   LAD: The vessel was large sized  Angiography showed no evidence of disease  2nd diagonal: There was a 30 % stenosis at the ostium of the vessel segment  Circumflex: Normal   Left posterior descending artery: The vessel was medium to large sized  Angiography showed no evidence of disease  RCA: Normal  The takeoff off the RCA was high and in the area of the L coronary cusp  An AL1 catheter was used to visualize the vessel      CARDIAC STRUCTURES:  There were no left ventricular global or regional wall motion abnormalities  EF calculated by contrast ventriculography was 65 %  The left ventricle was normal in size      7/28 cxr: No acute abnormality in the chest        Results from last 7 days   Lab Units 07/29/19 0301 07/28/19  1212 07/28/19  1153   WBC Thousand/uL 11 17* 7 68  --    HEMOGLOBIN g/dL 11 6* 12 8  --    I STAT HEMOGLOBIN g/dl  --   --  12 6   HEMATOCRIT % 35 7* 38 9  --    HEMATOCRIT, ISTAT %  --   --  37   PLATELETS Thousands/uL 280 355  --    NEUTROS ABS Thousands/µL  --  5 59  --          Results from last 7 days   Lab Units 07/29/19 0301 07/28/19 1212 07/28/19  1153   SODIUM mmol/L 138 138  --    POTASSIUM mmol/L 4 1 4 1  --    CHLORIDE mmol/L 103 103  --    CO2 mmol/L 28 24  --    CO2, I-STAT mmol/L  --   --  21   AGAP mmol/L  --   --  18*   ANION GAP mmol/L 7 11  --    BUN mg/dL 19 21  --    CREATININE mg/dL 1 40* 1 40*  --    EGFR ml/min/1 73sq m 62 62 62   CALCIUM mg/dL 7 8* 8 4  --    CALCIUM, IONIZED, ISTAT mmol/L  --   --  1 04*   MAGNESIUM mg/dL 1 8 1 9  --              Results from last 7 days   Lab Units 07/29/19 0301 07/28/19  1212   GLUCOSE RANDOM mg/dL 113 141*       Results from last 7 days   Lab Units 07/28/19  1212   TROPONIN I ng/mL 0 10*         Results from last 7 days   Lab Units 07/28/19  1212   PROTIME seconds 14 3   INR  1 17   PTT seconds 37     Results from last 7 days   Lab Units 07/28/19  1212   TSH 3RD GENERATON uIU/mL 2 454       Results from last 7 days   Lab Units 07/29/19  1008   CLARITY UA  Clear   COLOR UA  Yellow   SPEC GRAV UA  >=1 030   PH UA  5 5   GLUCOSE UA mg/dl Negative   KETONES UA mg/dl Negative   BLOOD UA  Negative   PROTEIN UA mg/dl Negative   NITRITE UA  Negative   BILIRUBIN UA  Negative   UROBILINOGEN UA E U /dl 0 2   LEUKOCYTES UA  Negative     ED Treatment:   Medication Administration from 07/28/2019 1137 to 07/28/2019 1355       Date/Time Order Dose Route Action     07/28/2019 1211 heparin (porcine) injection 4,000 Units 4,000 Units Intravenous Given     07/28/2019 1214 ticagrelor (BRILINTA) tablet 180 mg 180 mg Oral Given     07/28/2019 1215 LORazepam (ATIVAN) 2 mg/mL injection 1 mg 1 mg Intravenous Given     07/28/2019 1212 aspirin chewable tablet 324 mg 324 mg Oral Given     07/28/2019 1247 sodium chloride 0 9 % infusion 75 mL/hr Intravenous New Bag     07/28/2019 1328 midazolam (VERSED) injection 1 mg Intravenous Given     07/28/2019 1251 midazolam (VERSED) injection 2 mg Intravenous Given     07/28/2019 1327 fentanyl citrate (PF) 100 MCG/2ML 50 mcg Intravenous Given     07/28/2019 1251 fentanyl citrate (PF) 100 MCG/2ML 50 mcg Intravenous Given     07/28/2019 1255 lidocaine (XYLOCAINE) 1 % injection 10 mL Infiltration Given        Past Medical History:   Diagnosis Date    Arthritis     Back pain     Cancer ([facility] [address] )     skin cancer on face    DDD (degenerative disc disease), lumbar     Foot pain, left     History of herniated intervertebral disc     x 6    Obesity     Scratches     Hands    Seasonal allergies     Snores      Present on Admission:   Complete heart block (HCC)   XIOMARA (acute kidney injury) ([facility] [address] )      Admitting Diagnosis: Complete heart block ([facility] [address] ) [I44 2]  Chest pain [R07 9]  ST elevation [R94 31]  Elevated troponin I level [R74 8]  Age/Sex: 37 y o  male  Admission Orders:  48 hr telemetry  Cardiopulmonary monitoring  npo  Transfer to another facility  IP CONSULT TO INFECTIOUS DISEASES  IP CONSULT TO MEDICAL CRITICAL CARE      Facility-Administered Medications Ordered in Other Encounters:  acetaminophen 650 mg Oral Q4H PRN   baclofen 10 mg Oral TID   cefTRIAXone 2,000 mg Intravenous Q24H   [START ON 7/30/2019] enoxaparin 40 mg Subcutaneous Daily   HYDROmorphone 1 mg Intravenous Once   HYDROmorphone 1 mg Intravenous Q4H PRN   melatonin 3 mg Oral HS   oxyCODONE 10 mg Oral Q4H PRN   oxyCODONE 5 mg Oral Q4H PRN   sodium chloride (PF) 3 mL Intravenous PRN   sodium chloride 100 mL/hr Intravenous Continuous     Network Utilization Review Department  Phone: [phone]; Fax [phone]  [email or web address]  ATTENTION: Please call with any questions or concerns to [phone]  and carefully listen to the prompts so that you are directed to the right person  Send all requests for admission clinical reviews, approved or denied determinations and any other requests to fax [phone]   All voicemails are confidential

## 2019-07-30 ENCOUNTER — APPOINTMENT (INPATIENT)
Dept: NON INVASIVE DIAGNOSTICS | Facility: HOSPITAL | Age: 39
DRG: 868 | End: 2019-07-30
Payer: COMMERCIAL

## 2019-07-30 LAB
ANION GAP SERPL CALCULATED.3IONS-SCNC: 7 MMOL/L (ref 4–13)
ATRIAL RATE: 77 BPM
ATRIAL RATE: 98 BPM
B BURGDOR IGG PATRN SER IB-IMP: POSITIVE
B BURGDOR IGM PATRN SER IB-IMP: POSITIVE
B BURGDOR18KD IGG SER QL IB: PRESENT
B BURGDOR23KD IGG SER QL IB: PRESENT
B BURGDOR23KD IGM SER QL IB: PRESENT
B BURGDOR28KD IGG SER QL IB: ABNORMAL
B BURGDOR30KD IGG SER QL IB: ABNORMAL
B BURGDOR39KD IGG SER QL IB: PRESENT
B BURGDOR39KD IGM SER QL IB: PRESENT
B BURGDOR41KD IGG SER QL IB: PRESENT
B BURGDOR41KD IGM SER QL IB: PRESENT
B BURGDOR45KD IGG SER QL IB: ABNORMAL
B BURGDOR58KD IGG SER QL IB: ABNORMAL
B BURGDOR66KD IGG SER QL IB: ABNORMAL
B BURGDOR93KD IGG SER QL IB: PRESENT
BASOPHILS # BLD AUTO: 0.02 THOUSANDS/ΜL (ref 0–0.1)
BASOPHILS NFR BLD AUTO: 0 % (ref 0–1)
BUN SERPL-MCNC: 16 MG/DL (ref 5–25)
CALCIUM SERPL-MCNC: 7.8 MG/DL (ref 8.3–10.1)
CHLORIDE SERPL-SCNC: 106 MMOL/L (ref 100–108)
CO2 SERPL-SCNC: 23 MMOL/L (ref 21–32)
CREAT SERPL-MCNC: 1 MG/DL (ref 0.6–1.3)
EOSINOPHIL # BLD AUTO: 0.01 THOUSAND/ΜL (ref 0–0.61)
EOSINOPHIL NFR BLD AUTO: 0 % (ref 0–6)
ERYTHROCYTE [DISTWIDTH] IN BLOOD BY AUTOMATED COUNT: 11.9 % (ref 11.6–15.1)
GFR SERPL CREATININE-BSD FRML MDRD: 93 ML/MIN/1.73SQ M
GLUCOSE SERPL-MCNC: 106 MG/DL (ref 65–140)
HCT VFR BLD AUTO: 34.7 % (ref 36.5–49.3)
HGB BLD-MCNC: 11.5 G/DL (ref 12–17)
IMM GRANULOCYTES # BLD AUTO: 0.03 THOUSAND/UL (ref 0–0.2)
IMM GRANULOCYTES NFR BLD AUTO: 0 % (ref 0–2)
LYMPHOCYTES # BLD AUTO: 1.31 THOUSANDS/ΜL (ref 0.6–4.47)
LYMPHOCYTES NFR BLD AUTO: 14 % (ref 14–44)
MAGNESIUM SERPL-MCNC: 2 MG/DL (ref 1.6–2.6)
MCH RBC QN AUTO: 29.5 PG (ref 26.8–34.3)
MCHC RBC AUTO-ENTMCNC: 33.1 G/DL (ref 31.4–37.4)
MCV RBC AUTO: 89 FL (ref 82–98)
MONOCYTES # BLD AUTO: 0.66 THOUSAND/ΜL (ref 0.17–1.22)
MONOCYTES NFR BLD AUTO: 7 % (ref 4–12)
NEUTROPHILS # BLD AUTO: 7.05 THOUSANDS/ΜL (ref 1.85–7.62)
NEUTS SEG NFR BLD AUTO: 79 % (ref 43–75)
NRBC BLD AUTO-RTO: 0 /100 WBCS
P AXIS: 0 DEGREES
PLATELET # BLD AUTO: 265 THOUSANDS/UL (ref 149–390)
PMV BLD AUTO: 10.8 FL (ref 8.9–12.7)
POTASSIUM SERPL-SCNC: 3.7 MMOL/L (ref 3.5–5.3)
PR INTERVAL: 400 MS
QRS AXIS: -13 DEGREES
QRS AXIS: -3 DEGREES
QRSD INTERVAL: 113 MS
QRSD INTERVAL: 113 MS
QT INTERVAL: 325 MS
QT INTERVAL: 338 MS
QTC INTERVAL: 383 MS
QTC INTERVAL: 415 MS
RBC # BLD AUTO: 3.9 MILLION/UL (ref 3.88–5.62)
SODIUM SERPL-SCNC: 136 MMOL/L (ref 136–145)
T WAVE AXIS: 16 DEGREES
T WAVE AXIS: 9 DEGREES
VENTRICULAR RATE: 77 BPM
VENTRICULAR RATE: 98 BPM
WBC # BLD AUTO: 9.08 THOUSAND/UL (ref 4.31–10.16)

## 2019-07-30 PROCEDURE — 93005 ELECTROCARDIOGRAM TRACING: CPT

## 2019-07-30 PROCEDURE — 99232 SBSQ HOSP IP/OBS MODERATE 35: CPT | Performed by: EMERGENCY MEDICINE

## 2019-07-30 PROCEDURE — 99254 IP/OBS CNSLTJ NEW/EST MOD 60: CPT | Performed by: INTERNAL MEDICINE

## 2019-07-30 PROCEDURE — 85025 COMPLETE CBC W/AUTO DIFF WBC: CPT | Performed by: EMERGENCY MEDICINE

## 2019-07-30 PROCEDURE — 80048 BASIC METABOLIC PNL TOTAL CA: CPT | Performed by: EMERGENCY MEDICINE

## 2019-07-30 PROCEDURE — 93926 LOWER EXTREMITY STUDY: CPT | Performed by: SURGERY

## 2019-07-30 PROCEDURE — 02PAX2Z REMOVAL OF MONITORING DEVICE FROM HEART, EXTERNAL APPROACH: ICD-10-PCS | Performed by: EMERGENCY MEDICINE

## 2019-07-30 PROCEDURE — NC001 PR NO CHARGE: Performed by: INTERNAL MEDICINE

## 2019-07-30 PROCEDURE — 99232 SBSQ HOSP IP/OBS MODERATE 35: CPT | Performed by: INTERNAL MEDICINE

## 2019-07-30 PROCEDURE — 93926 LOWER EXTREMITY STUDY: CPT

## 2019-07-30 PROCEDURE — 93010 ELECTROCARDIOGRAM REPORT: CPT | Performed by: INTERNAL MEDICINE

## 2019-07-30 PROCEDURE — 83735 ASSAY OF MAGNESIUM: CPT | Performed by: EMERGENCY MEDICINE

## 2019-07-30 RX ORDER — ONDANSETRON 2 MG/ML
INJECTION INTRAMUSCULAR; INTRAVENOUS
Status: COMPLETED
Start: 2019-07-30 | End: 2019-07-30

## 2019-07-30 RX ORDER — ONDANSETRON 2 MG/ML
4 INJECTION INTRAMUSCULAR; INTRAVENOUS ONCE
Status: COMPLETED | OUTPATIENT
Start: 2019-07-30 | End: 2019-07-30

## 2019-07-30 RX ORDER — KETOROLAC TROMETHAMINE 30 MG/ML
INJECTION, SOLUTION INTRAMUSCULAR; INTRAVENOUS
Status: COMPLETED
Start: 2019-07-30 | End: 2019-07-30

## 2019-07-30 RX ORDER — AMOXICILLIN 250 MG
2 CAPSULE ORAL 2 TIMES DAILY
Status: DISCONTINUED | OUTPATIENT
Start: 2019-07-30 | End: 2019-08-01 | Stop reason: HOSPADM

## 2019-07-30 RX ORDER — KETOROLAC TROMETHAMINE 30 MG/ML
30 INJECTION, SOLUTION INTRAMUSCULAR; INTRAVENOUS ONCE
Status: COMPLETED | OUTPATIENT
Start: 2019-07-30 | End: 2019-07-30

## 2019-07-30 RX ORDER — CYCLOBENZAPRINE HCL 10 MG
10 TABLET ORAL 3 TIMES DAILY PRN
Status: DISPENSED | OUTPATIENT
Start: 2019-07-30 | End: 2019-08-01

## 2019-07-30 RX ADMIN — BACLOFEN 10 MG: 10 TABLET ORAL at 07:46

## 2019-07-30 RX ADMIN — SIMETHICONE CHEW TAB 80 MG 80 MG: 80 TABLET ORAL at 17:03

## 2019-07-30 RX ADMIN — ONDANSETRON 4 MG: 2 INJECTION INTRAMUSCULAR; INTRAVENOUS at 17:34

## 2019-07-30 RX ADMIN — CYCLOBENZAPRINE HYDROCHLORIDE 10 MG: 10 TABLET, FILM COATED ORAL at 12:31

## 2019-07-30 RX ADMIN — ACETAMINOPHEN 650 MG: 325 TABLET ORAL at 17:17

## 2019-07-30 RX ADMIN — LIDOCAINE 1 PATCH: 50 PATCH CUTANEOUS at 17:04

## 2019-07-30 RX ADMIN — KETOROLAC TROMETHAMINE 30 MG: 30 INJECTION, SOLUTION INTRAMUSCULAR; INTRAVENOUS at 11:46

## 2019-07-30 RX ADMIN — HYDROMORPHONE HYDROCHLORIDE 1 MG: 1 INJECTION, SOLUTION INTRAMUSCULAR; INTRAVENOUS; SUBCUTANEOUS at 05:14

## 2019-07-30 RX ADMIN — HYDROMORPHONE HYDROCHLORIDE 1 MG: 1 INJECTION, SOLUTION INTRAMUSCULAR; INTRAVENOUS; SUBCUTANEOUS at 10:32

## 2019-07-30 RX ADMIN — BACLOFEN 10 MG: 10 TABLET ORAL at 21:16

## 2019-07-30 RX ADMIN — MELATONIN 3 MG: 3 TAB ORAL at 21:16

## 2019-07-30 RX ADMIN — OXYCODONE HYDROCHLORIDE 10 MG: 10 TABLET ORAL at 07:45

## 2019-07-30 RX ADMIN — ENOXAPARIN SODIUM 40 MG: 40 INJECTION SUBCUTANEOUS at 12:31

## 2019-07-30 RX ADMIN — CEFTRIAXONE 2000 MG: 2 INJECTION, POWDER, FOR SOLUTION INTRAMUSCULAR; INTRAVENOUS at 14:01

## 2019-07-30 RX ADMIN — BACLOFEN 10 MG: 10 TABLET ORAL at 17:03

## 2019-07-30 RX ADMIN — KETOROLAC TROMETHAMINE 30 MG: 30 INJECTION, SOLUTION INTRAMUSCULAR at 11:46

## 2019-07-30 RX ADMIN — OXYCODONE HYDROCHLORIDE 10 MG: 10 TABLET ORAL at 00:22

## 2019-07-30 RX ADMIN — SENNOSIDES AND DOCUSATE SODIUM 2 TABLET: 8.6; 5 TABLET ORAL at 17:03

## 2019-07-30 NOTE — UTILIZATION REVIEW
Initial Clinical Review    Admission: Date/Time/Statement: 7/29/19 AT 1639  URGENT INPATIENT TRANSFER FROM [facility] ICU TO  [address] ICU    Orders Placed This Encounter   Procedures    Inpatient Admission     Standing Status:   Standing     Number of Occurrences:   1     Order Specific Question:   Admitting Physician     Answer:   Jorge Van     Order Specific Question:   Level of Care     Answer:   Critical Care [15]     Order Specific Question:   Bed request comments     Answer:   tele     Order Specific Question:   Estimated length of stay     Answer:   More than 2 Midnights     Order Specific Question:   Certification     Answer:   I certify that inpatient services are medically necessary for this patient for a duration of greater than two midnights  See H&P and MD Progress Notes for additional information about the patient's course of treatment  Arrival Information:  7/29/19 AT 1639  URGENT INPATIENT TRANSFER FROM [facility] ICU TO [facility] ICU    Assessment/Plan: 37year old male who initially presented to [facility] ED 7/28/19  2nd recently feeling more tired, short of breath, lightheaded and weak with inability to carry 10year old daughter  Had 2 day history of symptom worsening with associated chest pain + SOB  On initial ED presentation he was noted to be in complete heart block in the 30s  Blood pressure was normotensive  1st troponin was 0 1 with abnormal ST segments, repeat EKG showed ST elevations  Patient was taken emergently to the cath lab - Temp Pacemaker ws placed  Cath demonstrated normal coronary arteries  Bedside TTE was completed  Patient was then evaluated by Cardiology who spoke with electrophysiology and decided to transfer him to [facility] for continued ICU care, evaluation with Electrophysiology and I+D Consults and possible permanent pacemaker  LYME ANTIBODY SCREEN POSITIVE    SEEN BY I+D, DX LYME CARDITIS WITH COMPLETE HEART BLOCK AND IV ROCEPHIN STARTED  TEMP PACEMAKER CONTINUED      Initial Vitals   Temperature Pulse Respirations Blood Pressure SpO2   07/29/19 1210 07/29/19 1210 07/29/19 1210 07/29/19 1210 07/29/19 1210   98 3 °F (36 8 °C) 59 18 139/78 97 %      Temp Source Heart Rate Source Patient Position - Orthostatic VS BP Location FiO2 (%)   07/29/19 1210 07/29/19 1900 07/29/19 1210 07/29/19 1210 --   Oral Monitor Lying Left arm       Pain Score       07/29/19 1252       9        Wt Readings from Last 1 Encounters:   07/29/19 121 kg (267 lb 6 7 oz)     Additional Vital Signs:   07/30/19 0745  98 8 °F (37 1 °C)  68  22  128/93  --  --    07/30/19 0700  98 8  62  13  138/76  91  93 %    07/30/19 0600  --  60 w/ Temp Pacer  13  127/64  88  94 %    07/30/19 0500  --  60  22  144/76  106  94 %    07/30/19 0400  --  60  16  128/84  93  95 %    07/30/19 0300  99 5 °F (37 5 °C)  60 w/ Temp Pacer  17  122/82  95  94 %    07/30/19 0200  --  58  12  120/76  92  94 %    07/30/19 0101  --  58 w/ Temp Pacerw/ Temp Pacer  12  129/71  88  96 %    07/29/19 2301  99 6 F  60  10Abnormal   136/59  77  96 %    07/29/19 2201  --  60 w/ Temp Pacer  9Abnormal   123/59  74  96 %    07/29/19 2100  --  58  14  129/64  73  95 %    07/29/19 1900  99 1 °F (37 3 °C)  60 w/ Temp Pacer  14  120/57  70  94 %    07/29/19 1655  --  52Abnormal w/ Temp Pacer  11Abnormal   135/63  86  93 %      Pertinent Labs/Diagnostic Test Results:   Results from last 7 days   Lab Units 07/30/19  0518 07/29/19  0301 07/28/19  1212 07/28/19  1153   WBC Thousand/uL 9 08 11 17* 7 68  --    HEMOGLOBIN g/dL 11 5* 11 6* 12 8  --    I STAT HEMOGLOBIN g/dl  --   --   --  12 6   HEMATOCRIT % 34 7* 35 7* 38 9  --    HEMATOCRIT, ISTAT %  --   --   --  37   PLATELETS Thousands/uL 265 280 355  --    NEUTROS ABS Thousands/µL 7 05  --  5 59  --      Results from last 7 days   Lab Units 07/30/19  0518 07/29/19  0301 07/28/19  1212 07/28/19  1153   SODIUM mmol/L 136 138 138  -- POTASSIUM mmol/L 3 7 4 1 4 1  --    CHLORIDE mmol/L 106 103 103  --    CO2 mmol/L 23 28 24  --    CO2, I-STAT mmol/L  --   --   --  21   AGAP mmol/L  --   --   --  18*   ANION GAP mmol/L 7 7 11  --    BUN mg/dL 16 19 21  --    CREATININE mg/dL 1 00 1 40* 1 40*  --    EGFR ml/min/1 73sq m 93 62 62 62   CALCIUM mg/dL 7 8* 7 8* 8 4  --    CALCIUM, IONIZED, ISTAT mmol/L  --   --   --  1 04*   MAGNESIUM mg/dL 2 0 1 8 1 9  --      Results from last 7 days   Lab Units 07/30/19  0518 07/29/19  0301 07/28/19  1212   GLUCOSE RANDOM mg/dL 106 113 141*     Results from last 7 days   Lab Units 07/28/19  1212   TROPONIN I ng/mL 0 10*     Results from last 7 days   Lab Units 07/28/19  1212   PROTIME seconds 14 3   INR  1 17   PTT seconds 37       Results from last 7 days   Lab Units 07/29/19  1008   CLARITY UA  Clear   COLOR UA  Yellow   SPEC GRAV UA  >=1 030   PH UA  5 5   GLUCOSE UA mg/dl Negative   KETONES UA mg/dl Negative   BLOOD UA  Negative   PROTEIN UA mg/dl Negative   NITRITE UA  Negative   BILIRUBIN UA  Negative   UROBILINOGEN UA E U /dl 0 2   LEUKOCYTES UA  Negative     Results from last 7 days   Lab Units 07/28/19  1433   LYME AB IGG  2 35*   LYME AB IGM  15 51*     Microbiology Results (last 21 days)   Procedure Component Value - Date/Time   Blood culture [[id number]] Collected: 07/29/19 0536   Lab Status: Preliminary result Specimen: Blood from Arm, Right Updated: 07/30/19 1002    Blood Culture No Growth at 24 hrs  Blood culture [[id number]] Collected: 07/29/19 0533   Lab Status: Preliminary result Specimen: Blood from Arm, Left Updated: 07/30/19 1102    Blood Culture No Growth at 24 hrs       EKG -  7/29/19  - Sinus rhythm with complete heart block and ventricular paced rhythm    7/30/19 -  Normal sinus rhythm with 2nd degree SA block (Mobitz I) with 1st degree A-V block  Nonspecific ST abnormality  Abnormal ECG    Past Medical History:   Diagnosis Date    Arthritis     Back pain     Cancer ([facility] [address] )     skin cancer on face    DDD (degenerative disc disease), lumbar     Foot pain, left     History of herniated intervertebral disc     x 6    Obesity     Scratches     Hands    Seasonal allergies     Snores      Present on Admission:   Complete heart block (HCC)   Chronic midline low back pain without sciatica   Lyme carditis    Admitting Diagnosis: Complete heart block ([facility] [address] ) [I44 2]    Age/Sex: 37 y o  male    Admission Orders:  CARDIO PULM MONITORING  ST SEGMENT MONITORING  TEMP PACER HR AT 60     NASAL O2 AT 2L/MIN TITRATE SPO2 > 92 %  CONTINUOUS PULSE OXIMETRY    IP CONSULT TO ELECTROPHYSIOLOGY  IP CONSULT TO INFECTIOUS DISEASES    Current Facility-Administered Medications:  acetaminophen 650 mg Oral Q4H PRN  GIVEN X 1   baclofen 10 mg Oral TID   cefTRIAXone 2,000 mg Intravenous Q24H   cyclobenzaprine 10 mg Oral TID PRN  GIVEN X 1   enoxaparin 40 mg Subcutaneous Daily   HYDROmorphone 1 mg Intravenous Q4H PRN  GIVEN X 2   lidocaine 1 patch Topical Daily   melatonin 3 mg Oral HS   oxyCODONE 10 mg Oral Q4H PRN  GIVEN X 4   oxyCODONE 5 mg Oral Q4H PRN   senna-docusate sodium 2 tablet Oral BID   simethicone 80 mg Oral Q6H PRN  GIVEN X 1   sodium chloride (PF) 3 mL Intravenous PRN   sodium chloride 100 mL/hr Intravenous Continuous     Network Utilization Review Department  Phone: [phone]; Fax [phone]  [email or web address]  ATTENTION: Please call with any questions or concerns to [phone]  and carefully listen to the prompts so that you are directed to the right person  Send all requests for admission clinical reviews, approved or denied determinations and any other requests to fax [phone]   All voicemails are confidential

## 2019-07-30 NOTE — CONSULTS
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  - Lyme titers elevated  C/w Ceftriaxone 2g q24H  Today is day 3 out of 28 day course of of abx  - ID on board  Question if will need PICC line for DC or if appropriate for PO doxy  - moving temporary pacer to temporary PPM today  - K > 4, Mg >2  Please replace K today if not done already    2  Chronic back pain  - requiring narcotic pain medications  - please ensure bowel regimen  - consider flexeril if clinically warranted    3  Acute kidney injury, resolved  - Cr 1 from 1 4 yesterday with IVF  Would encourage PO intake and DC IVF    Please await attending physician final attestation  Yajaira Diaz MD  - PGY-4 Cardiology Fellow  - Pager: [phone]  ----                                  Review of Systems  ROS as noted above, otherwise 12 point review of systems was performed and is negative  Historical Information   Past Medical History:   Diagnosis Date    Arthritis     Back pain     Cancer ([address] )     skin cancer on face    DDD (degenerative disc disease), lumbar     Foot pain, left     History of herniated intervertebral disc     x 6    Obesity     Scratches     Hands    Seasonal allergies     Snores      Past Surgical History:   Procedure Laterality Date    [place] ANKLE SCOPE,PLANTAR FASCIOTOMY Left 12/26/2018    Procedure: RELEASE FASCIA PLANTAR/FASCIOTOMY ENDOSCOPIC (EPF);   Surgeon: Adrian Michel DPM;  Location: [facility];  Service: Podiatry    SEPTOPLASTY      SKIN CANCER EXCISION      Facial- by nose    VASECTOMY       Social History     Substance and Sexual Activity   Alcohol Use Yes    Comment: Rarely- 3 monthly     Social History     Substance and Sexual Activity   Drug Use No     Social History     Tobacco Use   Smoking Status Former Smoker    Years: 2 00   Smokeless Tobacco Never Used     Family History: non-contributory    Meds/Allergies   Hospital Medications:   Current Facility-Administered Medications   Medication Dose Route Frequency    acetaminophen (TYLENOL) tablet 650 mg  650 mg Oral Q4H PRN    baclofen tablet 10 mg  10 mg Oral TID    cefTRIAXone (ROCEPHIN) 2,000 mg in dextrose 5 % 50 mL IVPB  2,000 mg Intravenous Q24H    enoxaparin (LOVENOX) subcutaneous injection 40 mg  40 mg Subcutaneous Daily    HYDROmorphone (DILAUDID) injection 1 mg  1 mg Intravenous Q4H PRN    lidocaine (LIDODERM) 5 % patch 1 patch  1 patch Topical Daily    melatonin tablet 3 mg  3 mg Oral HS    oxyCODONE (ROXICODONE) immediate release tablet 10 mg  10 mg Oral Q4H PRN    oxyCODONE (ROXICODONE) IR tablet 5 mg  5 mg Oral Q4H PRN    simethicone (MYLICON) chewable tablet 80 mg  80 mg Oral Q6H PRN    sodium chloride (PF) 0 9 % injection 3 mL  3 mL Intravenous PRN    sodium chloride 0 9 % infusion  100 mL/hr Intravenous Continuous     Home Medications:   Medications Prior to Admission   Medication    acetaminophen (TYLENOL) 500 mg tablet    ibuprofen (MOTRIN) 600 mg tablet       Allergies   Allergen Reactions    Penicillins Rash     Childhood       Objective   Vitals: Blood pressure 157/83, pulse 74, temperature 98 8 °F (37 1 °C), temperature source Oral, resp  rate 13, height 6' 2" (1 88 m), weight 121 kg (267 lb 6 7 oz), SpO2 93 %    Orthostatic Blood Pressures      Most Recent Value   Blood Pressure  157/83 filed at 07/30/2019 1000   Patient Position - Orthostatic VS  Lying filed at 07/30/2019 0600 Intake/Output Summary (Last 24 hours) at 2019 1047  Last data filed at 2019 1042  Gross per 24 hour   Intake 2888  33 ml   Output 1725 ml   Net 1163 33 ml       Invasive Devices     Peripheral Intravenous Line            Peripheral IV 19 Left Antecubital 1 day    Peripheral IV 19 Right Antecubital 1 day          Line            Pacer Wires 1 day    Venous Sheath 6 Fr  Right Femoral 1 day          Airway            Supraglottic Airway LMA 4 216 days                Physical Exam    Lab Results: I have personally reviewed pertinent lab results  Results from last 7 days   Lab Units 19  0301 19  1212   WBC Thousand/uL 9 08 11 17* 7 68   HEMOGLOBIN g/dL 11 5* 11 6* 12 8   HEMATOCRIT % 34 7* 35 7* 38 9   PLATELETS Thousands/uL 265 280 355     Results from last 7 days   Lab Units 19  0301 19  1212 19  1153   POTASSIUM mmol/L 3 7 4 1 4 1  --    CHLORIDE mmol/L 106 103 103  --    CO2 mmol/L 23 28 24  --    CO2, I-STAT mmol/L  --   --   --  21   BUN mg/dL 16 19 21  --    CREATININE mg/dL 1 00 1 40* 1 40*  --    GLUCOSE, ISTAT mg/dl  --   --   --  148*   CALCIUM mg/dL 7 8* 7 8* 8 4  --      Results from last 7 days   Lab Units 19  1212   INR  1 17   PTT seconds 37     Results from last 7 days   Lab Units 19  0301 19  1212   MAGNESIUM mg/dL 2 0 1 8 1 9       Imaging: I have personally reviewed pertinent reports      ECHO:   Results for orders placed during the hospital encounter of 19   Echo Complete with Contrast if Indicated    Narrative [facility] [facility]  [phone]    Transthoracic Echocardiogram  2D, M-mode, Doppler, and Color Doppler    Study date:  2019    Patient: Natasha Sin  MR number: [id number]  Account number: [de-identified]  : 1980  Age: 37 years  Gender: Male  Status: Inpatient  Location: Bedside  Height: 74 in  Weight: 265 5 lb  BP: 102/ 68 mmHg    Indications: Syncope  Diagnoses: R55  - Syncope and collapse    Sonographer:  Yamile Toledo RDCS  Primary Physician:  Mary Ann Krishnamurthy MD  Referring Physician:  Gabriele Broussard MD  Group:  [facility]  Interpreting Physician:  Mayte Diaz MD    SUMMARY    LEFT VENTRICLE:  Systolic function was normal  Ejection fraction was estimated to be 60 %  Although no diagnostic regional wall motion abnormality was identified, this possibility cannot be completely excluded on the basis of this study  Wall thickness was mildly increased  VENTRICULAR SEPTUM:  There was dyssynergic motion  These changes are consistent with a conduction abnormality or paced rhythm  HISTORY: PRIOR HISTORY: Complete heart block  XIOMARA  Former smoker  PROCEDURE: The procedure was performed at the bedside  This was a routine study  The transthoracic approach was used  The study included complete 2D imaging, M-mode, complete spectral Doppler, and color Doppler  The heart rate was 59 bpm,  at the start of the study  Intravenous contrast (  8 mL of definity in NSS) was administered to opacify the left ventricle  Echocardiographic views were limited due to restricted patient mobility, poor acoustic window availability, and  lung interference  This was a technically difficult study  LEFT VENTRICLE: Size was normal  Systolic function was normal  Ejection fraction was estimated to be 60 %  Although no diagnostic regional wall motion abnormality was identified, this possibility cannot be completely excluded on the basis  of this study  Wall thickness was mildly increased  DOPPLER: The transmitral flow pattern was abnormal     VENTRICULAR SEPTUM: There was dyssynergic motion  These changes are consistent with a conduction abnormality or paced rhythm  RIGHT VENTRICLE: The size was normal  Systolic function was normal  A pacing wire was present  Not well visualized      LEFT ATRIUM: Size was normal  Not well visualized  RIGHT ATRIUM: Size was normal  Not well visualized  MITRAL VALVE: Valve structure was normal  There was normal leaflet separation  DOPPLER: There was no evidence for stenosis  There was mild regurgitation  AORTIC VALVE: The valve was probably trileaflet  Leaflets exhibited normal cuspal separation  The valve was not well visualized  DOPPLER: There was no evidence for stenosis  There was no significant regurgitation  TRICUSPID VALVE: The valve structure was normal  There was normal leaflet separation  DOPPLER: There was no evidence for stenosis  There was mild regurgitation  Estimated peak PA pressure was 20 mmHg  PULMONIC VALVE: Leaflets exhibited normal thickness, no calcification, and normal cuspal separation  DOPPLER: The transpulmonic velocity was within the normal range  There was trace regurgitation  PERICARDIUM: There was no pericardial effusion  A pericardial fat pad was present  The pericardium was normal in appearance  AORTA: The root exhibited normal size  SYSTEMIC VEINS: IVC: The inferior vena cava was dilated  The respirophasic change in diameter was less than 50%      SYSTEM MEASUREMENT TABLES    2D  %FS: 42 1 %  Ao Diam: 3 69 cm  EDV(Teich): 111 55 ml  EF(Teich): 72 96 %  ESV(Teich): 30 16 ml  IVSd: 1 3 cm  LA Area: 26 56 cm2  LA Diam: 3 55 cm  LVEDV MOD A4C: 134 93 ml  LVEF MOD A4C: 60 59 %  LVESV MOD A4C: 53 17 ml  LVIDd: 4 88 cm  LVIDs: 2 82 cm  LVLd A4C: 9 03 cm  LVLs A4C: 6 91 cm  LVPWd: 1 17 cm  RA Area: 20 63 cm2  RVIDd: 4 35 cm  SV MOD A4C: 81 75 ml  SV(Teich): 81 39 ml    CW  TR MaxP 4 mmHg  TR Vmax: 2 14 m/s    MM  TAPSE: 1 65 cm    [employer] Accredited Echocardiography Laboratory    Prepared and electronically signed by    Gabbie Lopez MD  Signed 2019 13:16:22

## 2019-07-30 NOTE — CONSULTS
Consultation - Infectious Disease   Les Alaniz 37 y o  male MRN: [id number]  Unit/Bed#: [facility] 388-54 Encounter: [id number]      IMPRESSION & RECOMMENDATIONS:   Impression/Recommendations:  1  Complete heart block  Consider due to Lyme disease  Cardiac catheterization unremarkable  Improving with IV antibiotics  Rec:  ? Continue ceftriaxone for now   ? Follow-up confirmatory Lyme Western blot  ? Once ready for discharge can transition to doxycycline with plan to complete 21 days total of antibiotics  ? Monitor on telemetry in ICU for now     2  Probable Lyme disease  In setting of # 1   positive Lyme screen, Western blot pending  Rec:  ? Continue antibiotics as above  ? Follow-up Lyme Western blot     Discussed the above plan in detail with the patient and his wife at the bedside  Antibiotics:  Ceftriaxone # 3    Thank you for this consultation  We will follow along with you  HISTORY OF PRESENT ILLNESS:  Reason for Consult:  Lyme disease    HPI: Les Alaniz is a 37 y o  male previously healthy who presented to [facility] with progressive dyspnea on exertion, fatigue, and near syncope  Upon presentation he was found to have bradycardia with complete heart block  He had some ST elevations and went to the cath lab which revealed no evidence of occlusive disease  He had a pacemaker placed and was started on IV ceftriaxone given high suspicion for Lyme disease as the patient works outside doing construction  The patient unable to lay flat on bed rest due to chronic back pain and was transferred to [place] for possible pacemaker change  He was noted today to have improved conduction and temporary pacemaker was removed  In performing this consult, I have reviewed prior admission and outpatient visit records in detail  REVIEW OF SYSTEMS:  Denies fevers, chills, sweats, nausea, vomiting, or diarrhea  A complete system-based review of systems is otherwise negative      PAST MEDICAL HISTORY:  Past Medical History:   Diagnosis Date    Arthritis     Back pain     Cancer ([address] )     skin cancer on face    DDD (degenerative disc disease), lumbar     Foot pain, left     History of herniated intervertebral disc     x 6    Obesity     Scratches     Hands    Seasonal allergies     Snores      Past Surgical History:   Procedure Laterality Date    [place] ANKLE SCOPE,PLANTAR FASCIOTOMY Left 2018    Procedure: RELEASE FASCIA PLANTAR/FASCIOTOMY ENDOSCOPIC (EPF); Surgeon: Laurie Hoff DPM;  Location[facility];  Service: Podiatry    SEPTOPLASTY      SKIN CANCER EXCISION      Facial- by nose    VASECTOMY         FAMILY HISTORY:  Non-contributory    SOCIAL HISTORY:  Social History     Substance and Sexual Activity   Alcohol Use Yes    Comment: Rarely- 3 monthly     Social History     Substance and Sexual Activity   Drug Use No     Social History     Tobacco Use   Smoking Status Former Smoker    Years: 2 00   Smokeless Tobacco Never Used       ALLERGIES:  Allergies   Allergen Reactions    Penicillins Rash     Childhood       MEDICATIONS:  All current active medications have been reviewed      PHYSICAL EXAM:  Vitals:  Temp:  [98 °F (36 7 °C)-99 6 °F (37 6 °C)] 99 2 °F (37 3 °C)  HR:  [] 108  Resp:  [9-22] 18  BP: (116-160)/(57-93) 133/80  SpO2:  [93 %-96 %] 95 %  Temp (24hrs), Av °F (37 2 °C), Min:98 °F (36 7 °C), Max:99 6 °F (37 6 °C)  Current: Temperature: 99 2 °F (37 3 °C)     Physical Exam:  General:  Well-nourished, well-developed, in no acute distress  Eyes:  Conjunctive clear with no hemorrhages or effusions  Oropharynx:  No ulcers, no lesions  Neck:  Supple, no lymphadenopathy  Lungs:  Clear to auscultation bilaterally, no accessory muscle use  Cardiac:  Regular rate and rhythm, no murmurs  Abdomen:  Soft, non-tender, non-distended  Extremities:  No peripheral cyanosis, clubbing, or edema  Skin:  No rashes, no ulcers  Neurological:  Moves all four extremities spontaneously, sensation grossly intact    LABS, IMAGING, & OTHER STUDIES:  Lab Results:  I have personally reviewed pertinent labs  Results from last 7 days   Lab Units 07/30/19  0518 07/29/19  0301 07/28/19  1212 07/28/19  1153   POTASSIUM mmol/L 3 7 4 1 4 1  --    CHLORIDE mmol/L 106 103 103  --    CO2 mmol/L 23 28 24  --    CO2, I-STAT mmol/L  --   --   --  21   BUN mg/dL 16 19 21  --    CREATININE mg/dL 1 00 1 40* 1 40*  --    EGFR ml/min/1 73sq m 93 62 62 62   GLUCOSE, ISTAT mg/dl  --   --   --  148*   CALCIUM mg/dL 7 8* 7 8* 8 4  --      Results from last 7 days   Lab Units 07/30/19  0518 07/29/19  0301 07/28/19  1212   WBC Thousand/uL 9 08 11 17* 7 68   HEMOGLOBIN g/dL 11 5* 11 6* 12 8   PLATELETS Thousands/uL 265 280 355     Results from last 7 days   Lab Units 07/29/19  0536 07/29/19  0533   BLOOD CULTURE  No Growth at 24 hrs  No Growth at 24 hrs  Imaging Studies:   I have personally reviewed pertinent imaging study reports and images in PACS  EKG, Pathology, and Other Studies:   I have personally reviewed pertinent reports

## 2019-07-30 NOTE — PROGRESS NOTES
Venous Femoral Pacer Removal Note    Right femoral venous pacer removed with attending physician Dr Bob Lala  Pressure was held for 10 minutes  No bleeding seen at site  Distal pulses intact although cool feet noted chronically, right slightly more than left  3 hour bedrest  Able to move around in bed for comfort  No plan for temporary PPM at this time given patient conducting well at this time   Plan communicated to primary team     Orlando Slois MD  - PGY-4 Cardiology Fellow  - Pager: [phone]

## 2019-07-30 NOTE — PLAN OF CARE
Problem: Prexisting or High Potential for Compromised Skin Integrity  Goal: Skin integrity is maintained or improved  Description  INTERVENTIONS:  - Identify patients at risk for skin breakdown  - Assess and monitor skin integrity  - Assess and monitor nutrition and hydration status  - Monitor labs (i e  albumin)  - Assess for incontinence   - Turn and reposition patient  - Assist with mobility/ambulation  - Relieve pressure over bony prominences  - Avoid friction and shearing  - Provide appropriate hygiene as needed including keeping skin clean and dry  - Evaluate need for skin moisturizer/barrier cream  - Collaborate with interdisciplinary team (i e  Nutrition, Rehabilitation, etc )   - Patient/family teaching  Outcome: Progressing     Problem: PAIN - ADULT  Goal: Verbalizes/displays adequate comfort level or baseline comfort level  Description  Interventions:  - Encourage patient to monitor pain and request assistance  - Assess pain using appropriate pain scale  - Administer analgesics based on type and severity of pain and evaluate response  - Implement non-pharmacological measures as appropriate and evaluate response  - Consider cultural and social influences on pain and pain management  - Notify physician/advanced practitioner if interventions unsuccessful or patient reports new pain  Outcome: Progressing     Problem: INFECTION - ADULT  Goal: Absence or prevention of progression during hospitalization  Description  INTERVENTIONS:  - Assess and monitor for signs and symptoms of infection  - Monitor lab/diagnostic results  - Monitor all insertion sites, i e  indwelling lines, tubes, and drains  - Monitor endotracheal (as able) and nasal secretions for changes in amount and color  - [place] appropriate cooling/warming therapies per order  - Administer medications as ordered  - Instruct and encourage patient and family to use good hand hygiene technique  - Identify and instruct in appropriate isolation precautions for identified infection/condition  Outcome: Progressing  Goal: Absence of fever/infection during neutropenic period  Description  INTERVENTIONS:  - Monitor WBC  - Implement neutropenic guidelines  Outcome: Progressing     Problem: SAFETY ADULT  Goal: Patient will remain free of falls  Description  INTERVENTIONS:  - Assess patient frequently for physical needs  -  Identify cognitive and physical deficits and behaviors that affect risk of falls    -  [place] fall precautions as indicated by assessment   - Educate patient/family on patient safety including physical limitations  - Instruct patient to call for assistance with activity based on assessment  - Modify environment to reduce risk of injury  - Consider OT/PT consult to assist with strengthening/mobility  Outcome: Progressing  Goal: Maintain or return to baseline ADL function  Description  INTERVENTIONS:  -  Assess patient's ability to carry out ADLs; assess patient's baseline for ADL function and identify physical deficits which impact ability to perform ADLs (bathing, care of mouth/teeth, toileting, grooming, dressing, etc )  - Assess/evaluate cause of self-care deficits   - Assess range of motion  - Assess patient's mobility; develop plan if impaired  - Assess patient's need for assistive devices and provide as appropriate  - Encourage maximum independence but intervene and supervise when necessary  ¯ Involve family in performance of ADLs  ¯ Assess for home care needs following discharge   ¯ Request OT consult to assist with ADL evaluation and planning for discharge  ¯ Provide patient education as appropriate  Outcome: Progressing  Goal: Maintain or return mobility status to optimal level  Description  INTERVENTIONS:  - Assess patient's baseline mobility status (ambulation, transfers, stairs, etc )    - Identify cognitive and physical deficits and behaviors that affect mobility  - Identify mobility aids required to assist with transfers and/or ambulation (gait belt, sit-to-stand, lift, walker, cane, etc )  - [place] fall precautions as indicated by assessment  - Record patient progress and toleration of activity level on Mobility SBAR; progress patient to next Phase/Stage  - Instruct patient to call for assistance with activity based on assessment  - Request Rehabilitation consult to assist with strengthening/weightbearing, etc   Outcome: Progressing     Problem: DISCHARGE PLANNING  Goal: Discharge to home or other facility with appropriate resources  Description  INTERVENTIONS:  - Identify barriers to discharge w/patient and caregiver  - Arrange for needed discharge resources and transportation as appropriate  - Identify discharge learning needs (meds, wound care, etc )  - Arrange for interpretive services to assist at discharge as needed  - Refer to Case Management Department for coordinating discharge planning if the patient needs post-hospital services based on physician/advanced practitioner order or complex needs related to functional status, cognitive ability, or social support system  Outcome: Progressing     Problem: Knowledge Deficit  Goal: Patient/family/caregiver demonstrates understanding of disease process, treatment plan, medications, and discharge instructions  Description  Complete learning assessment and assess knowledge base  Interventions:  - Provide teaching at level of understanding  - Provide teaching via preferred learning methods  Outcome: Progressing     Problem: CARDIOVASCULAR - ADULT  Goal: Maintains optimal cardiac output and hemodynamic stability  Description  INTERVENTIONS:  - Monitor I/O, vital signs and rhythm  - Monitor for S/S and trends of decreased cardiac output i e  bleeding, hypotension  - Administer and titrate ordered vasoactive medications to optimize hemodynamic stability  - Assess quality of pulses, skin color and temperature  - Assess for signs of decreased coronary artery perfusion - ex   Angina  - Instruct patient to report change in severity of symptoms  Outcome: Progressing  Goal: Absence of cardiac dysrhythmias or at baseline rhythm  Description  INTERVENTIONS:  - Continuous cardiac monitoring, monitor vital signs, obtain 12 lead EKG if indicated  - Administer antiarrhythmic and heart rate control medications as ordered  - Monitor electrolytes and administer replacement therapy as ordered  Outcome: Progressing     Problem: GENITOURINARY - ADULT  Goal: Maintains or returns to baseline urinary function  Description  INTERVENTIONS:  - Assess urinary function  - Encourage oral fluids to ensure adequate hydration  - Administer IV fluids as ordered to ensure adequate hydration  - Administer ordered medications as needed  - Offer frequent toileting  - Follow urinary retention protocol if ordered  Outcome: Progressing     Problem: METABOLIC, FLUID AND ELECTROLYTES - ADULT  Goal: Electrolytes maintained within normal limits  Description  INTERVENTIONS:  - Monitor labs and assess patient for signs and symptoms of electrolyte imbalances  - Administer electrolyte replacement as ordered  - Monitor response to electrolyte replacements, including repeat lab results as appropriate  - Instruct patient on fluid and nutrition as appropriate  Outcome: Progressing  Goal: Fluid balance maintained  Description  INTERVENTIONS:  - Monitor labs and assess for signs and symptoms of volume excess or deficit  - Monitor I/O and WT  - Instruct patient on fluid and nutrition as appropriate  Outcome: Progressing     Problem: SKIN/TISSUE INTEGRITY - ADULT  Goal: Skin integrity remains intact  Description  INTERVENTIONS  - Identify patients at risk for skin breakdown  - Assess and monitor skin integrity  - Assess and monitor nutrition and hydration status  - Monitor labs (i e  albumin)  - Assess for incontinence   - Turn and reposition patient  - Assist with mobility/ambulation  - Relieve pressure over bony prominences  - Avoid friction and shearing  - Provide appropriate hygiene as needed including keeping skin clean and dry  - Evaluate need for skin moisturizer/barrier cream  - Collaborate with interdisciplinary team (i e  Nutrition, Rehabilitation, etc )   - Patient/family teaching  Outcome: Progressing  Goal: Incision(s), wounds(s) or drain site(s) healing without S/S of infection  Description  INTERVENTIONS  - Assess and document risk factors for skin impairment   - Assess and document dressing, incision, wound bed, drain sites and surrounding tissue  - Initiate Nutrition services consult and/or wound management as needed  Outcome: Progressing

## 2019-07-30 NOTE — PROGRESS NOTES
07/30/19 1100   Spiritual Beliefs/Perceptions   Support Systems Spouse/significant other   Stress Factors   Patient Stress Factors None identified   Coping Responses   Patient Coping Other (Comment)   Plan of Care   Comments Pt  was awake, explored for relational needs, provided a caring, supportive presence     Assessment Completed by: Unit visit

## 2019-07-30 NOTE — PROGRESS NOTES
Progress Note - Critical Care   Daphne Begun 37 y o  male MRN: [id number]  Unit/Bed#: [facility] 515-01 Encounter: [id number]    Assessment:  Complete heart block, Lyme carditis, chronic back low back pain         Neuro:  Pain:  Requiring Oxy 10 and p r n  Dilaudid 1 mg for back pain   - plan to deescalate narcotics today once femoral line removed   -no sedation                 CV:  Complete heart block   - right groin temporary pacer wire rate of 60   - plan for temp versus perm pacemaker placement today   - EP consult and following   - ultrasound right groin to rule out pseudoaneurysm                 Lung:  Stable, no acute issues                 GI:  No acute issues   - NPO midnight   - planned escalate diet postprocedure early   - no indication for stress ulcer prophylaxis time                 FEN: NaCl 100mL/hr   - DC fluids postprocedure   - replete electrolytes   - NPO, advance diet                 :  Low urine output   - 500 mL urine last 24 hours   - continue IV hydration   - creatinine improved at 1 0                 ID:  Lyme carditis   - Lyme antibody IgG and IgM positive   - reflex to Western blot   - infectious disease following   - continue Rocephin 2 g Q 12                 Heme:  Continue monitor hemoglobin   - levels have been stable   - potential for pseudoaneurysm at catheter insertion site, will ultrasound to rule out                 Endo:  No acute issues                            Msk/Skin:  Frequent repositioning, off loading                 Disposition: ICU until procedure, discuss transition to Med Surg if tolerates procedure and remains hemodynamically stable  Chief Complaint:  Back pain    HPI/24hr events:  Transferred from [facility]  EP consultation  Plan for temp versus perm pacemaker placement today    Lyme titers positive    Physical Exam:   Constitutional:  Well-developed well-nourished middle-aged male lying flat in bed in no acute distress  HEENT:  Normocephalic atraumatic  Pupils equal round reactive to light  Neck is supple with full range of motion no JVD noted  CV:  Complete heart block or monitor  Appropriate S1-S2 no murmurs rubs noted  Pulm:  Clear to auscultation bilaterally with normal inspiratory effort no respiratory distress  Abdomen:  Soft, nontender nondistended  Extremities:  Moves all 4 extremities spontaneously  Right femoral groin sheath in place  Neuro:  GCS 15  Vitals:    19 2301 19 2357 19 0001 19 0101   BP: 136/59  128/65 129/71   BP Location: Left arm  Left arm Left arm   Pulse: 60  60 58   Resp: (!) 10  14 12   Temp:  99 6 °F (37 6 °C)     TempSrc:  Oral     SpO2: 96%  96% 96%   Weight:       Height:                 Temperature:   Temp (24hrs), Av 7 °F (37 1 °C), Min:98 °F (36 7 °C), Max:99 6 °F (37 6 °C)    Current: Temperature: 99 6 °F (37 6 °C)    Weights:   IBW: 82 2 kg    Body mass index is 34 33 kg/m²  Weight (last 2 days)     Date/Time   Weight    19 1210   121 (267 42)              Hemodynamic Monitoring:  N/A     Non-Invasive/Invasive Ventilation Settings:  Respiratory    Lab Data (Last 4 hours)    None         O2/Vent Data (Last 4 hours)    None              No results found for: PHART, ZAS4LJS, PO2ART, UWT7NVV, D1GJWLXY, BEART, SOURCE  SpO2: SpO2: 96 %, SpO2 Activity: SpO2 Activity: At Rest, SpO2 Device: O2 Device: None (Room air)    Intake and Outputs:  I/O       701 -  0700  07 -  0700    P  O   700    I V  (mL/kg)  1111 7 (9 2)    IV Piggyback  75    Total Intake(mL/kg)  1886 7 (15 6)    Urine (mL/kg/hr)  525    Total Output  525    Net  +1361 7              UOP: 525 over 24 hours   Nutrition:        Diet Orders   (From admission, onward)             Start     Ordered    19 0001  Diet NPO  Diet effective midnight     Question Answer Comment   Diet Type NPO    RD to adjust diet per protocol?  Yes        19 1416    19 1220  Room Service  Once Question:  Type of Service  Answer:  Room Service - Appropriate with Assistance    07/29/19 1219              NPO    Labs:   Results from last 7 days   Lab Units 07/30/19  0518 07/29/19  0301 07/28/19  1212   WBC Thousand/uL 9 08 11 17* 7 68   HEMOGLOBIN g/dL 11 5* 11 6* 12 8   HEMATOCRIT % 34 7* 35 7* 38 9   PLATELETS Thousands/uL 265 280 355   NEUTROS PCT % 79*  --  72   MONOS PCT % 7  --  8    Results from last 7 days   Lab Units 07/30/19  0518 07/29/19  0301 07/28/19  1212 07/28/19  1153   SODIUM mmol/L 136 138 138  --    POTASSIUM mmol/L 3 7 4 1 4 1  --    CHLORIDE mmol/L 106 103 103  --    CO2 mmol/L 23 28 24  --    CO2, I-STAT mmol/L  --   --   --  21   BUN mg/dL 16 19 21  --    CREATININE mg/dL 1 00 1 40* 1 40*  --    CALCIUM mg/dL 7 8* 7 8* 8 4  --    GLUCOSE, ISTAT mg/dl  --   --   --  148*     Results from last 7 days   Lab Units 07/30/19  0518 07/29/19  0301 07/28/19  1212   MAGNESIUM mg/dL 2 0 1 8 1 9          Results from last 7 days   Lab Units 07/28/19  1212   INR  1 17   PTT seconds 37         0   Lab Value Date/Time    TROPONINI 0 10 (H) 07/28/2019 1212       Imaging: No results found  I have personally reviewed pertinent reports  EKG:  V paced at 60 per minute due to 3rd degree heart block    Micro:  No results found for: Saintclair Marble, SPUTUMCULTUR    Allergies:    Allergies   Allergen Reactions    Penicillins Rash     Childhood       Medications:   Scheduled Meds:  Current Facility-Administered Medications:  acetaminophen 650 mg Oral Q4H PRN Kamla Sykes PA-C    baclofen 10 mg Oral TID Kamla Sykes PA-C    cefTRIAXone 2,000 mg Intravenous Q24H Kamla Sykes PA-C Last Rate: Stopped (07/29/19 1501)   enoxaparin 40 mg Subcutaneous Daily Kamla Sykes PA-C    HYDROmorphone 1 mg Intravenous Q4H PRN Kamla Sykes PA-C    lidocaine 1 patch Topical Daily Marisol Cochran PA-C    melatonin 3 mg Oral HS Kamla Sykes PA-C    oxyCODONE 10 mg Oral Q4H PRN Ken Bosch PA-C    oxyCODONE 5 mg Oral Q4H PRN Ken Bosch PA-C    simethicone 80 mg Oral Q6H PRN Gianni Gordon PA-C    sodium chloride (PF) 3 mL Intravenous PRN Ken Bosch PA-C    sodium chloride 100 mL/hr Intravenous Continuous Ken Bosch PA-C Last Rate: 100 mL/hr (07/29/19 1613)     Continuous Infusions:  sodium chloride 100 mL/hr Last Rate: 100 mL/hr (07/29/19 1613)     PRN Meds:    acetaminophen 650 mg Q4H PRN   HYDROmorphone 1 mg Q4H PRN   oxyCODONE 10 mg Q4H PRN   oxyCODONE 5 mg Q4H PRN   simethicone 80 mg Q6H PRN   sodium chloride (PF) 3 mL PRN       VTE Pharmacologic Prophylaxis: Enoxaparin (Lovenox)  VTE Mechanical Prophylaxis: sequential compression device    Invasive lines and devices: Invasive Devices     Peripheral Intravenous Line            Peripheral IV 07/28/19 Left Antecubital 1 day    Peripheral IV 07/28/19 Right Antecubital 1 day          Line            Pacer Wires 1 day    Venous Sheath 6 Fr  Right Femoral 1 day          Airway            Supraglottic Airway LMA 4 215 days                   Counseling / Coordination of Care  Total Critical Care time spent 20 minutes excluding procedures, teaching and family updates  Code Status: Level 1 - Full Code     Portions of the record may have been created with voice recognition software  Occasional wrong word or "sound a like" substitutions may have occurred due to the inherent limitations of voice recognition software  Read the chart carefully and recognize, using context, where substitutions have occurred       Ken Bosch PA-C

## 2019-07-30 NOTE — SOCIAL WORK
Initial interview:     CM met with the patient and his wife, Sally Loyd, at bedside to review the CM role and discuss possible dc needs  Pt lives with his wife and two daughters in a 2 story home in [place], [place]  [address]  2nd floor bedroom and bathroom on each level  Pt is independent, self-employed contractor and drives  No DME  No hx of VNA or IP rehab  Pt denied drug, etoh or mental illness history  Wife is POA; CM requested document for chart copy  Prescriptions are filled at [facility] off [facility] in [place]  Main contact: Wife Eboni Menon [phone], cell [phone]    FARRELL plan - improved cardiac rhythm, probable cancellation of PPM placement  Wife and pt denied any dc needs at this time  Agreeable to [facility] at dc for any new prescriptions  CM reviewed d/c planning process including the following: identifying help at home, patient preference for d/c planning needs, Discharge Lounge, [facility] to Bed program, availability of treatment team to discuss questions or concerns patient and/or family may have regarding understanding medications and recognizing signs and symptoms once discharged  CM also encouraged patient to follow up with all recommended appointments after discharge  Patient advised of importance for patient and family to participate in managing patients medical well being

## 2019-07-30 NOTE — PROGRESS NOTES
07/30/19 1100   Clinical Encounter Type   Visited With Patient   Routine Visit Introduction   Continue Visiting Yes

## 2019-07-31 PROBLEM — N17.9 AKI (ACUTE KIDNEY INJURY) (HCC): Status: RESOLVED | Noted: 2019-07-28 | Resolved: 2019-07-31

## 2019-07-31 PROBLEM — I44.0 FIRST DEGREE AV BLOCK: Status: ACTIVE | Noted: 2019-07-31

## 2019-07-31 PROBLEM — I44.2 COMPLETE HEART BLOCK (HCC): Status: RESOLVED | Noted: 2019-07-28 | Resolved: 2019-07-31

## 2019-07-31 PROBLEM — K59.01 SLOW TRANSIT CONSTIPATION: Status: ACTIVE | Noted: 2019-07-31

## 2019-07-31 LAB
ANION GAP SERPL CALCULATED.3IONS-SCNC: 6 MMOL/L (ref 4–13)
BUN SERPL-MCNC: 12 MG/DL (ref 5–25)
CALCIUM SERPL-MCNC: 8.5 MG/DL (ref 8.3–10.1)
CHLORIDE SERPL-SCNC: 108 MMOL/L (ref 100–108)
CO2 SERPL-SCNC: 26 MMOL/L (ref 21–32)
CREAT SERPL-MCNC: 1.08 MG/DL (ref 0.6–1.3)
ERYTHROCYTE [DISTWIDTH] IN BLOOD BY AUTOMATED COUNT: 11.9 % (ref 11.6–15.1)
GFR SERPL CREATININE-BSD FRML MDRD: 85 ML/MIN/1.73SQ M
GLUCOSE SERPL-MCNC: 101 MG/DL (ref 65–140)
HCT VFR BLD AUTO: 37.4 % (ref 36.5–49.3)
HGB BLD-MCNC: 12.5 G/DL (ref 12–17)
MAGNESIUM SERPL-MCNC: 2.5 MG/DL (ref 1.6–2.6)
MCH RBC QN AUTO: 29.8 PG (ref 26.8–34.3)
MCHC RBC AUTO-ENTMCNC: 33.4 G/DL (ref 31.4–37.4)
MCV RBC AUTO: 89 FL (ref 82–98)
PLATELET # BLD AUTO: 305 THOUSANDS/UL (ref 149–390)
PMV BLD AUTO: 10.5 FL (ref 8.9–12.7)
POTASSIUM SERPL-SCNC: 3.7 MMOL/L (ref 3.5–5.3)
RBC # BLD AUTO: 4.2 MILLION/UL (ref 3.88–5.62)
SODIUM SERPL-SCNC: 140 MMOL/L (ref 136–145)
WBC # BLD AUTO: 8.23 THOUSAND/UL (ref 4.31–10.16)

## 2019-07-31 PROCEDURE — NC001 PR NO CHARGE: Performed by: INTERNAL MEDICINE

## 2019-07-31 PROCEDURE — 99232 SBSQ HOSP IP/OBS MODERATE 35: CPT | Performed by: INTERNAL MEDICINE

## 2019-07-31 PROCEDURE — 85027 COMPLETE CBC AUTOMATED: CPT | Performed by: NURSE PRACTITIONER

## 2019-07-31 PROCEDURE — 99232 SBSQ HOSP IP/OBS MODERATE 35: CPT | Performed by: EMERGENCY MEDICINE

## 2019-07-31 PROCEDURE — 80048 BASIC METABOLIC PNL TOTAL CA: CPT | Performed by: NURSE PRACTITIONER

## 2019-07-31 PROCEDURE — NC001 PR NO CHARGE: Performed by: EMERGENCY MEDICINE

## 2019-07-31 PROCEDURE — 83735 ASSAY OF MAGNESIUM: CPT | Performed by: NURSE PRACTITIONER

## 2019-07-31 PROCEDURE — 99233 SBSQ HOSP IP/OBS HIGH 50: CPT | Performed by: INTERNAL MEDICINE

## 2019-07-31 RX ORDER — MAGNESIUM CARB/ALUMINUM HYDROX 105-160MG
296 TABLET,CHEWABLE ORAL ONCE
Status: COMPLETED | OUTPATIENT
Start: 2019-07-31 | End: 2019-07-31

## 2019-07-31 RX ORDER — ERGOCALCIFEROL 1.25 MG/1
50000 CAPSULE ORAL WEEKLY
Status: DISCONTINUED | OUTPATIENT
Start: 2019-07-31 | End: 2019-08-01 | Stop reason: HOSPADM

## 2019-07-31 RX ADMIN — BACLOFEN 10 MG: 10 TABLET ORAL at 21:34

## 2019-07-31 RX ADMIN — BACLOFEN 10 MG: 10 TABLET ORAL at 08:17

## 2019-07-31 RX ADMIN — ERGOCALCIFEROL 50000 UNITS: 1.25 CAPSULE ORAL at 18:13

## 2019-07-31 RX ADMIN — SENNOSIDES AND DOCUSATE SODIUM 2 TABLET: 8.6; 5 TABLET ORAL at 17:19

## 2019-07-31 RX ADMIN — ENOXAPARIN SODIUM 40 MG: 40 INJECTION SUBCUTANEOUS at 08:17

## 2019-07-31 RX ADMIN — MAGNESIUM CITRATE 296 ML: 1.75 LIQUID ORAL at 17:19

## 2019-07-31 RX ADMIN — CEFTRIAXONE 2000 MG: 2 INJECTION, POWDER, FOR SOLUTION INTRAMUSCULAR; INTRAVENOUS at 14:10

## 2019-07-31 RX ADMIN — BACLOFEN 10 MG: 10 TABLET ORAL at 17:22

## 2019-07-31 RX ADMIN — MELATONIN 3 MG: 3 TAB ORAL at 21:34

## 2019-07-31 RX ADMIN — SENNOSIDES AND DOCUSATE SODIUM 2 TABLET: 8.6; 5 TABLET ORAL at 08:17

## 2019-07-31 NOTE — PROGRESS NOTES
Progress Note - ICU Transfer to SD/MS manohar Vo 37 y o  male MRN: [id number]  [address] [facility]   Unit/Bed#: [facility] 595-29 Encounter: [id number]    Code Status: Level 1 - Full Code  POA:    POLST:       Reason for ICU admission:  3rd degree heart block due to Lyme carditis    Active problems:   Principal Problem:    Lyme carditis  Active Problems:    First degree AV block    Chronic midline low back pain without sciatica  Resolved Problems:    Complete heart block ([address] )      Consultants:   EP  ID    History of Present Illness:  40-year-old male that initially presented to [facility] on 07/28 with complaints of feeling more tired, weakness, lightheadedness, shortness of breath  Stated that he had been experiencing worsening symptoms over the past 2 days, along with worsening chest pain  During initial emergency department evaluation patient was noted to be bradycardic in the 30s with a complete third-degree heart block  Lyme titers were sent at that time and he was empirically treated with Rocephin 2 g for Lyme carditis  EKG showed abnormal ST segments and repeat EKG showed ST elevations  Along with an elevated troponin of 0 1  MI alert was activated and patient was taken to the cath lab  Emergent catheterization demonstrated normal coronary arteries, temporary pacing wires were placed in the right groin, and patient was admitted to ICU  During ICU stay patient was evaluated by Cardiology who felt that he was appropriate for transfer for electrophysiology evaluation at [facility]  Summary of clinical course:   Patient was transferred on 07/29 for EP evaluation at [facility]  Electrophysiology evaluated patient  Empiric treatment for Lyme carditis with Rocephin 2 g Q 12 continued  Yesterday pacing wire rate was turned down to 40 and patient's intrinsic pacemaker took over    He paced on his own for 2 hours and the right femoral pacing wires were removed  ME interval was prolonged and patient was in 1st degree AV block  He was monitored in the unit overnight due to p r n  interval greater than 600  This is decreased today to 400  Lyme Western blot test resulted positive  Plan to transition from IV Rocephin to doxycycline upon discharge  Recent or scheduled procedures:   None    Outstanding/pending diagnostics:   None    Cultures:   None       Mobilization Plan:   Out of bed to chair 3 times a day    Nutrition Plan:   Regular house diet    VTE Pharmacologic Prophylaxis: Enoxaparin (Lovenox)  VTE Mechanical Prophylaxis: sequential compression device    Discharge Plan:   Patient should be ready for discharge to home ME interval decreases or remain stable and patient can be transitioned to p o  Antibiotics  Initial Physical Therapy Recommendations:  Not evaluated  Initial Occupational Therapy Recommendations:  Not evaluated  Initial /Plan:  See case management note    Spoke with Dr Jay Dickinson  regarding transfer  Please call [phone] with any questions or concerns  Portions of the record may have been created with voice recognition software  Occasional wrong word or "sound a like" substitutions may have occurred due to the inherent limitations of voice recognition software  Read the chart carefully and recognize, using context, where substitutions have occurred      Micki Barragan PA-C

## 2019-07-31 NOTE — ASSESSMENT & PLAN NOTE
Patient has chronic low back pain due to degenerative discs which he treats at home with ibuprofen     - continue PRN tylenol, baclofen/flexeril (order will ), lidocaine patch  - has received one dose toradol; his XIOMARA has resolved so he can get this again if it helps

## 2019-07-31 NOTE — ASSESSMENT & PLAN NOTE
Patient reports that he has not had a bowel movement since admission   He has received multiple doses of opiates which he is no longer requiring    - senna/docusate 2 tabs BID started yesterday  - will give one dose of Citroma today

## 2019-07-31 NOTE — PROGRESS NOTES
Progress Note - Infectious Disease   Anjum Connolly 37 y o  male MRN: [id number]  Unit/Bed#: [facility] 515-01 Encounter: [id number]      Impression/Recommendations:  1   Complete heart block   Due to Lyme disease   Cardiac catheterization unremarkable  Improving with IV antibiotics  Rec:  ? Continue ceftriaxone for now   ? Once ready for discharge can transition to doxycycline with plan to complete 21 days total of antibiotics through 19     2   Lyme disease   In setting of # 1   Oositive Lyme screen and Western blot     Rec:  ? Continue antibiotics as above     Discussed the above plan in detail with the patient at the bedside  The patient is stable from an ID standpoint      Antibiotics:  Ceftriaxone # 4    Subjective:  Patient seen on AM rounds  Denies fevers, chills, sweats, nausea, vomiting, or diarrhea  24 Hour Events:  No documented fevers, chills, sweats, nausea, vomiting, or diarrhea  Now in 1st degree AVB  Objective:  Vitals:  Temp:  [98 9 °F (37 2 °C)-99 7 °F (37 6 °C)] 98 9 °F (37 2 °C)  HR:  [] 84  Resp:  [12-27] 22  BP: (104-148)/(58-89) 148/84  SpO2:  [93 %-97 %] 95 %  Temp (24hrs), Av 3 °F (37 4 °C), Min:98 9 °F (37 2 °C), Max:99 7 °F (37 6 °C)  Current: Temperature: 98 9 °F (37 2 °C)    Physical Exam:   General:  No acute distress  Psychiatric:  Awake and alert  Pulmonary:  Normal respiratory excursion without accessory muscle use  Abdomen:  Soft, nontender  Extremities:  No edema  Skin:  No rashes    Lab Results:  I have personally reviewed pertinent labs    Results from last 7 days   Lab Units 19  0448 19  0518 19  0301  19  1153   POTASSIUM mmol/L 3 7 3 7 4 1   < >  --    CHLORIDE mmol/L 108 106 103   < >  --    CO2 mmol/L 26 23 28   < >  --    CO2, I-STAT mmol/L  --   --   --   --  21   BUN mg/dL 12 16 19   < >  --    CREATININE mg/dL 1 08 1 00 1 40*   < >  --    EGFR ml/min/1 73sq m 85 93 62   < > 62   GLUCOSE, ISTAT mg/dl  --   --   --   --  148* CALCIUM mg/dL 8 5 7 8* 7 8*   < >  --     < > = values in this interval not displayed  Results from last 7 days   Lab Units 07/31/19  0448 07/30/19  0518 07/29/19  0301   WBC Thousand/uL 8 23 9 08 11 17*   HEMOGLOBIN g/dL 12 5 11 5* 11 6*   PLATELETS Thousands/uL 305 265 280     Results from last 7 days   Lab Units 07/29/19  0536 07/29/19  0533   BLOOD CULTURE  No Growth at 48 hrs  No Growth at 48 hrs  Imaging Studies:   I have personally reviewed pertinent imaging study reports and images in PACS  EKG, Pathology, and Other Studies:   I have personally reviewed pertinent reports

## 2019-07-31 NOTE — PROGRESS NOTES
INTERNAL MEDICINE RESIDENCY TRANSFER ACCEPTANCE NOTE     Name: Eris Burns   Age & Sex: 37 y o  male   MRN: [id number]  Unit/Bed#: [facility] 515-01   Encounter: [id number]  Hospital Stay Days: 2    Accepting team: SOD Team C   Code Status: Level 1 - Full Code  Disposition: Patient requires Med/Surg with Telemetry    ASSESSMENT/PLAN     Principal Problem:    Lyme carditis  Active Problems:    First degree AV block    Slow transit constipation    Chronic midline low back pain without sciatica      * Lyme carditis  Assessment & Plan  Lyme antibody and Western blot both positive  Infectious Disease service is following   - 2 g ceftriaxone b i d , today is day 4 of 21  - patient may be transitioned to doxycycline at discharge per ID for total of 21 days of therapy    First degree AV block  Assessment & Plan  Patient had initially been in complete heart block and was paced  Pacer wires removed yesterday and patient has been in sinus rhythm  His WI interval is being monitored and has decreased to the 270-280 range  - transfer to med surg floor today; continue telemetry  - follow up with EP to confirm when patient is safe for discharge home    Slow transit constipation  Assessment & Plan  Patient reports that he has not had a bowel movement since admission  He has received multiple doses of opiates which he is no longer requiring    - senna/docusate 2 tabs BID started yesterday  - will give one dose of Citroma today    Chronic midline low back pain without sciatica  Assessment & Plan  Patient has chronic low back pain due to degenerative discs which he treats at home with ibuprofen     - continue PRN tylenol, baclofen/flexeril (order will ), lidocaine patch  - has received one dose toradol; his XIOMARA has resolved so he can get this again if it helps      VTE Pharmacologic Prophylaxis: Enoxaparin (Lovenox)  VTE Mechanical Prophylaxis: sequential compression device    HOSPITAL COURSE     Per Angela Peraza PA-C's transfer note: "History of Present Illness:  47-year-old male that initially presented to [facility] on 07/28 with complaints of feeling more tired, weakness, lightheadedness, shortness of breath  Stated that he had been experiencing worsening symptoms over the past 2 days, along with worsening chest pain  During initial emergency department evaluation patient was noted to be bradycardic in the 30s with a complete third-degree heart block  Lyme titers were sent at that time and he was empirically treated with Rocephin 2 g for Lyme carditis  EKG showed abnormal ST segments and repeat EKG showed ST elevations  Along with an elevated troponin of 0 1  MI alert was activated and patient was taken to the cath lab  Emergent catheterization demonstrated normal coronary arteries, temporary pacing wires were placed in the right groin, and patient was admitted to ICU  During ICU stay patient was evaluated by Cardiology who felt that he was appropriate for transfer for electrophysiology evaluation at [facility]      Summary of clinical course:   Patient was transferred on 07/29 for EP evaluation at [facility]  Electrophysiology evaluated patient  Empiric treatment for Lyme carditis with Rocephin 2 g Q 12 continued  Yesterday pacing wire rate was turned down to 40 and patient's intrinsic pacemaker took over  He paced on his own for 2 hours and the right femoral pacing wires were removed  AL interval was prolonged and patient was in 1st degree AV block  He was monitored in the unit overnight due to AL interval greater than 600  This is decreased today to 400  Lyme Western blot test resulted positive  Plan to transition from IV Rocephin to doxycycline upon discharge  "    Patient has now been without the pacer for 20 hours  He has been in sinus rhythm with rates in the 90s  AL interval has been measured in the 270-280 range       SUBJECTIVE     Patient states that he feels very well today  He has been walking around the unit this morning without difficulty  He no longer feels the dizziness or weakness that he had come in with  He has had no chest pain  The discomfort at his femoral puncture site has improved significantly  Patient notes that he has had no bowel movement since admission  He denies fever, chills, SOB, n/v today, though states that he did have one episode of vomiting and, separately, an episode of fever and rigors yesterday  Vital signs reflect minimally elevated temperature in the 99s yesterday, highest 99 7  He has had no symptoms like this today  OBJECTIVE     Vitals:    07/31/19 0500 07/31/19 0600 07/31/19 0700 07/31/19 0800   BP: 135/71 112/58 134/68 148/84   BP Location:       Pulse: 82 78 80 84   Resp: 18 16 19 22   Temp:   98 9 °F (37 2 °C)    TempSrc:   Oral    SpO2: 96% 95% 94% 95%   Weight:       Height:         I/O last 24 hours: In: 1291 7 [P O :600; I V :641 7; IV Piggyback:50]  Out: 3242 [Urine:3525]    Physical Exam   Constitutional: He is oriented to person, place, and time  He appears well-developed and well-nourished  No distress  HENT:   Head: Normocephalic and atraumatic  Eyes: Conjunctivae and EOM are normal    Cardiovascular: Normal rate, regular rhythm and normal heart sounds  No murmur heard  Pulmonary/Chest: Effort normal and breath sounds normal  He has no wheezes  He has no rales  Abdominal: Soft  Bowel sounds are normal  He exhibits distension  There is no tenderness  Musculoskeletal: He exhibits no edema or deformity  Neurological: He is alert and oriented to person, place, and time  Skin: Skin is warm and dry  Puncture site at right groin intact, no ecchymosis   Psychiatric: He has a normal mood and affect  His behavior is normal      LABORATORY DATA     Labs: I have personally reviewed pertinent reports      Results from last 7 days   Lab Units 07/31/19  0448 07/30/19  0518 07/29/19  0301 07/28/19  1212   WBC Thousand/uL 8 23 9 08 11 17* 7 68   HEMOGLOBIN g/dL 12 5 11 5* 11 6* 12 8   HEMATOCRIT % 37 4 34 7* 35 7* 38 9   PLATELETS Thousands/uL 305 265 280 355   NEUTROS PCT %  --  79*  --  72   MONOS PCT %  --  7  --  8      Results from last 7 days   Lab Units 07/31/19  0448 07/30/19  0518 07/29/19  0301  07/28/19  1153   POTASSIUM mmol/L 3 7 3 7 4 1   < >  --    CHLORIDE mmol/L 108 106 103   < >  --    CO2 mmol/L 26 23 28   < >  --    CO2, I-STAT mmol/L  --   --   --   --  21   BUN mg/dL 12 16 19   < >  --    CREATININE mg/dL 1 08 1 00 1 40*   < >  --    CALCIUM mg/dL 8 5 7 8* 7 8*   < >  --    GLUCOSE, ISTAT mg/dl  --   --   --   --  148*    < > = values in this interval not displayed  Results from last 7 days   Lab Units 07/31/19 0448 07/30/19  0518 07/29/19  0301   MAGNESIUM mg/dL 2 5 2 0 1 8          Results from last 7 days   Lab Units 07/28/19  1212   INR  1 17   PTT seconds 37         Results from last 7 days   Lab Units 07/28/19  1212   TROPONIN I ng/mL 0 10*     Micro:  Lab Results   Component Value Date    BLOODCX No Growth at 48 hrs  07/29/2019    BLOODCX No Growth at 24 hrs  07/29/2019     IMAGING & DIAGNOSTIC TESTING     Imaging: I have personally reviewed pertinent reports  No results found  EKG, Pathology, and Other Studies: I have personally reviewed pertinent reports       ALLERGIES     Allergies   Allergen Reactions    Penicillins Rash     Childhood     MEDICATIONS       Current Facility-Administered Medications:  acetaminophen 650 mg Oral Q4H PRN Concha Riedel, PA-C    baclofen 10 mg Oral TID Concha Riedel, PA-C    cefTRIAXone 2,000 mg Intravenous Q24H Concha Riedel, PA-C Last Rate: Stopped (07/30/19 1431)   cyclobenzaprine 10 mg Oral TID PRN CORINNE Schultz    enoxaparin 40 mg Subcutaneous Daily Concha Riedel, PA-C    lidocaine 1 patch Topical Daily Merlyn Cruz PA-C    magnesium citrate 296 mL Oral Once Brent Richardson DO    melatonin 3 mg Oral HS Concha Riedel, PA-C    oxyCODONE 10 mg Oral Q4H PRN Sonjia FindRONEN garces-C    oxyCODONE 5 mg Oral Q4H PRN Sonsuleimana Finders, PA-C    senna-docusate sodium 2 tablet Oral BID CORINNE Corcoran    simethicone 80 mg Oral Q6H PRN RONEN Dawson-C    sodium chloride (PF) 3 mL Intravenous PRN Lania Tammy, PA-C           acetaminophen 650 mg Q4H PRN   cyclobenzaprine 10 mg TID PRN   oxyCODONE 10 mg Q4H PRN   oxyCODONE 5 mg Q4H PRN   simethicone 80 mg Q6H PRN   sodium chloride (PF) 3 mL PRN       Portions of the record may have been created with voice recognition software  Occasional wrong word or "sound alike" substitutions may have occurred due to the inherent limitations of voice recognition software    Read the chart carefully and recognize, using context, where substitutions have occurred     ==  Wil Barrios, [facility]  Internal Medicine Residency PGY-2

## 2019-07-31 NOTE — H&P
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 and heart rate was down to the 30s per EKG on 7/28/19  A pacemaker was inserted in the right groin  On 7/30/19 patient's heart block improved to the point where on EKG on 7/30/19 it was found to be 2nd degree mobitz type I SA and A-V block 1st degree  Patient was able to be de-escalated from ICU care to SOD floor  Patient is currently doing well with no acute symptoms  No headaches, no N/V/D, no fevers/ chills/ night sweats, no SOB, no chest palpitations, no stomach pain, no constipation, no dysuria, no pins and needles in the extremities       PMH: 3 herniated discs   PSH: vasectomy, basal cell carcinoma on face removed mohl surgery, nasal septum deviation, plantar fasciitis   Meds: none   FH: none   Allergies: penicillin   SH: very little alcohol, never smoked     O:   Vitals: stable, wnl     07/31/19 1500  98 9 °F (37 2 °C)  80  18  142/76  --  96 %  None (Room air)  --     PE:   Constitutional: alert and oriented to person/ place/ and time   HEENT: normocephalic, moist mucous membranes, PERRL   Cardiovascular: normal s1/s2, no murmurs, RRR  Respiratory: no crackles, lungs clear bilaterally   Abdominal: bowel sounds present  Skin: no visible rashes  Psychiatric: normal affect, calm     Labs:   Blood culture: negative at 48 hours    Magnesium: WNL 2 5     BMP: WNL   7/31/2019  5:37 AM     Component Value Flag Ref Range Units Status   Sodium 140   136 - 145 mmol/L Final   Potassium 3 7   3 5 - 5 3 mmol/L Final   Chloride 108   100 - 108 mmol/L Final   CO2 26   21 - 32 mmol/L Final   ANION GAP 6   4 - 13 mmol/L Final   BUN 12   5 - 25 mg/dL Final   Creatinine 1 08   0 60 - 1 30 mg/dL Final   Comment:   Standardized to IDMS reference method   Glucose 101   65 - 140 mg/dL Final   Comment:     CBC: WNL     7/31/2019  5:07 AM     Component Value Flag Ref Range Units Status   WBC 8 23   4 31 - 10 16 Thousand/uL Final   RBC 4 20   3 88 - 5 62 Million/uL Final   Hemoglobin 12 5   12 0 - 17 0 g/dL Final   Hematocrit 37 4   36 5 - 49 3 % Final   MCV 89   82 - 98 fL Final   MCH 29 8   26 8 - 34 3 pg Final   MCHC 33 4   31 4 - 37 4 g/dL Final   RDW 11 9   11 6 - 15 1 % Final   Platelets 078   301 - 390 Thousands/uL Final   MPV 10 5   8 9 - 12 7 fL Final     Assessment:   Kizzy Bhandari is a 37 y o  M with no significant PMH who is presenting for ICU step down on 7/31/19 due to fatigue and dizziness secondary to lyme 3rd degree heart block  1  Lyme carditis  Patient initially presented with fatigue and dizziness started on 7/25/19 and became progressively worse with time  Went into ED at [facility] and it was found that the lyme antibody was positive on 7/28/19 and EKG found a complete heart block on 7/28/19  Patient had to be admitted to ICU due to severe bradycardia in the 30s  Patient given pacemarker to help with 3rd degree heart block in right groin and IV ceftriaxone for the borrelia burgdoferi  And symptoms gradually resolved and pulse is no longer bradycardic  EKG on 7/30/19 revealed 1st degree AV block and prolonged RI interval  Patient was able to be de-escalated to regular floor on 7/31/19 after transfer to [place] ICU  Patient is doing well, currently asymptomatic  No other medical conditions being managed  Plan:   1   Lyme carditis:   -as per ID, can continue ceftriaxone  -medically stable  Can de-escalate to doxycycline upon discharge  -possible discharge tomorrow -no other medical conditions being managed

## 2019-07-31 NOTE — ASSESSMENT & PLAN NOTE
Lyme antibody and Western blot both positive    Infectious Disease service is following   - 2 g ceftriaxone b i d , today is day 4 of 21  - patient may be transitioned to doxycycline at discharge per ID for total of 21 days of therapy

## 2019-07-31 NOTE — ASSESSMENT & PLAN NOTE
Patient had initially been in complete heart block and was paced  Pacer wires removed yesterday and patient has been in sinus rhythm  His IL interval is being monitored and has decreased to the 270-280 range     - transfer to med surg floor today; continue telemetry  - follow up with EP to confirm when patient is safe for discharge home

## 2019-07-31 NOTE — CONSULTS
Consultation - Electrophysiology-Cardiology (EP)   Alexandria Lawler 37 y o  male MRN: [id number]  Unit/Bed#: [facility] 515-01 Encounter: [id number]      Consults    Subjective  Feel well this morning    Cardiac Imaging  - tele showing DE-interval of 290msec which continues to improve    - Cardiac: S1, S2, regular rate, no S3 or S4  appreciated  No murmurs  +2 PT, radial pulses  No peripheral edema No carotid bruits  - Resp: CTABL    A&P  Alexandria Lawler is a 37 y  o yo male who presented with syncope found to have completed heart block due to lyme carditis, improving    1  Lyme carditis  - complete heart block now in 1st degree AV block  Typically resolve within one week  - appreciate ID recs  Today is day 4 of abx  Plan for 21 days of abx course  PO doxy on DC  - DE-interval continues to improve  - f/u with cardiology outpatient    Please await attending physician final attestation  Clarence Zayas MD  - PGY-4 Cardiology Fellow  - Pager: [phone]  ----                                  Review of Systems  ROS as noted above, otherwise 12 point review of systems was performed and is negative  Historical Information   Past Medical History:   Diagnosis Date    Arthritis     Back pain     Cancer ([address] )     skin cancer on face    DDD (degenerative disc disease), lumbar     Foot pain, left     History of herniated intervertebral disc     x 6    Obesity     Scratches     Hands    Seasonal allergies     Snores      Past Surgical History:   Procedure Laterality Date    DE ANKLE SCOPE,PLANTAR FASCIOTOMY Left 12/26/2018    Procedure: RELEASE FASCIA PLANTAR/FASCIOTOMY ENDOSCOPIC (EPF);   Surgeon: Lonny Faustin DPM;  Location: [facility];  Service: Podiatry    SEPTOPLASTY      SKIN CANCER EXCISION      Facial- by nose    VASECTOMY       Social History     Substance and Sexual Activity   Alcohol Use Yes    Comment: Rarely- 3 monthly     Social History     Substance and Sexual Activity   Drug Use No     Social History Tobacco Use   Smoking Status Former Smoker    Years: 2 00   Smokeless Tobacco Never Used     Family History: non-contributory    Meds/Allergies   Hospital Medications:   Current Facility-Administered Medications   Medication Dose Route Frequency    acetaminophen (TYLENOL) tablet 650 mg  650 mg Oral Q4H PRN    baclofen tablet 10 mg  10 mg Oral TID    cefTRIAXone (ROCEPHIN) 2,000 mg in dextrose 5 % 50 mL IVPB  2,000 mg Intravenous Q24H    cyclobenzaprine (FLEXERIL) tablet 10 mg  10 mg Oral TID PRN    enoxaparin (LOVENOX) subcutaneous injection 40 mg  40 mg Subcutaneous Daily    lidocaine (LIDODERM) 5 % patch 1 patch  1 patch Topical Daily    melatonin tablet 3 mg  3 mg Oral HS    oxyCODONE (ROXICODONE) immediate release tablet 10 mg  10 mg Oral Q4H PRN    oxyCODONE (ROXICODONE) IR tablet 5 mg  5 mg Oral Q4H PRN    senna-docusate sodium (SENOKOT S) 8 6-50 mg per tablet 2 tablet  2 tablet Oral BID    simethicone (MYLICON) chewable tablet 80 mg  80 mg Oral Q6H PRN    sodium chloride (PF) 0 9 % injection 3 mL  3 mL Intravenous PRN     Home Medications:   Medications Prior to Admission   Medication    acetaminophen (TYLENOL) 500 mg tablet    ibuprofen (MOTRIN) 600 mg tablet       Allergies   Allergen Reactions    Penicillins Rash     Childhood       Objective   Vitals: Blood pressure 148/84, pulse 84, temperature 98 9 °F (37 2 °C), temperature source Oral, resp  rate 22, height 6' 2" (1 88 m), weight 121 kg (267 lb 6 7 oz), SpO2 95 %    Orthostatic Blood Pressures      Most Recent Value   Blood Pressure  148/84 filed at 07/31/2019 0800   Patient Position - Orthostatic VS  Lying filed at 07/31/2019 0300            Intake/Output Summary (Last 24 hours) at 7/31/2019 0819  Last data filed at 7/31/2019 0757  Gross per 24 hour   Intake 1091 67 ml   Output 3250 ml   Net -2158 33 ml       Invasive Devices     Peripheral Intravenous Line            Peripheral IV 07/28/19 Left Antecubital 2 days    Peripheral IV 19 Right Antecubital 2 days          Airway            Supraglottic Airway LMA 4 216 days                Physical Exam    Lab Results: I have personally reviewed pertinent lab results  Results from last 7 days   Lab Units 19  0518 19  0301   WBC Thousand/uL 8 23 9 08 11 17*   HEMOGLOBIN g/dL 12 5 11 5* 11 6*   HEMATOCRIT % 37 4 34 7* 35 7*   PLATELETS Thousands/uL 305 265 280     Results from last 7 days   Lab Units 198 19  0518 19  0301  19  1153   POTASSIUM mmol/L 3 7 3 7 4 1   < >  --    CHLORIDE mmol/L 108 106 103   < >  --    CO2 mmol/L 26 23 28   < >  --    CO2, I-STAT mmol/L  --   --   --   --  21   BUN mg/dL 12 16 19   < >  --    CREATININE mg/dL 1 08 1 00 1 40*   < >  --    GLUCOSE, ISTAT mg/dl  --   --   --   --  148*   CALCIUM mg/dL 8 5 7 8* 7 8*   < >  --     < > = values in this interval not displayed  Results from last 7 days   Lab Units 19  1212   INR  1 17   PTT seconds 37     Results from last 7 days   Lab Units 19  0518 19  0301   MAGNESIUM mg/dL 2 5 2 0 1 8       Imaging: I have personally reviewed pertinent reports  ECHO:   Results for orders placed during the hospital encounter of 19   Echo Complete with Contrast if Indicated    Narrative [place] [address], [address] [facility]  [phone]    Transthoracic Echocardiogram  2D, M-mode, Doppler, and Color Doppler    Study date:  2019    Patient: Brittany Quiroz  MR number: [id number]  Account number: [de-identified]  : 1980  Age: 37 years  Gender: Male  Status: Inpatient  Location: Bedside  Height: 74 in  Weight: 265 5 lb  BP: 102/ 68 mmHg    Indications: Syncope      Diagnoses: R55  - Syncope and collapse    Sonographer:  Javier Turner RDCS  Primary Physician:  Diallo Villarreal MD  Referring Physician:  Shay Gardner MD  Group:  [facility]  Interpreting Physician:  Leila Layne, MD    SUMMARY    LEFT VENTRICLE:  Systolic function was normal  Ejection fraction was estimated to be 60 %  Although no diagnostic regional wall motion abnormality was identified, this possibility cannot be completely excluded on the basis of this study  Wall thickness was mildly increased  VENTRICULAR SEPTUM:  There was dyssynergic motion  These changes are consistent with a conduction abnormality or paced rhythm  HISTORY: PRIOR HISTORY: Complete heart block  XIOMARA  Former smoker  PROCEDURE: The procedure was performed at the bedside  This was a routine study  The transthoracic approach was used  The study included complete 2D imaging, M-mode, complete spectral Doppler, and color Doppler  The heart rate was 59 bpm,  at the start of the study  Intravenous contrast (  8 mL of definity in NSS) was administered to opacify the left ventricle  Echocardiographic views were limited due to restricted patient mobility, poor acoustic window availability, and  lung interference  This was a technically difficult study  LEFT VENTRICLE: Size was normal  Systolic function was normal  Ejection fraction was estimated to be 60 %  Although no diagnostic regional wall motion abnormality was identified, this possibility cannot be completely excluded on the basis  of this study  Wall thickness was mildly increased  DOPPLER: The transmitral flow pattern was abnormal     VENTRICULAR SEPTUM: There was dyssynergic motion  These changes are consistent with a conduction abnormality or paced rhythm  RIGHT VENTRICLE: The size was normal  Systolic function was normal  A pacing wire was present  Not well visualized  LEFT ATRIUM: Size was normal  Not well visualized  RIGHT ATRIUM: Size was normal  Not well visualized  MITRAL VALVE: Valve structure was normal  There was normal leaflet separation  DOPPLER: There was no evidence for stenosis  There was mild regurgitation  AORTIC VALVE: The valve was probably trileaflet  Leaflets exhibited normal cuspal separation  The valve was not well visualized  DOPPLER: There was no evidence for stenosis  There was no significant regurgitation  TRICUSPID VALVE: The valve structure was normal  There was normal leaflet separation  DOPPLER: There was no evidence for stenosis  There was mild regurgitation  Estimated peak PA pressure was 20 mmHg  PULMONIC VALVE: Leaflets exhibited normal thickness, no calcification, and normal cuspal separation  DOPPLER: The transpulmonic velocity was within the normal range  There was trace regurgitation  PERICARDIUM: There was no pericardial effusion  A pericardial fat pad was present  The pericardium was normal in appearance  AORTA: The root exhibited normal size  SYSTEMIC VEINS: IVC: The inferior vena cava was dilated  The respirophasic change in diameter was less than 50%      SYSTEM MEASUREMENT TABLES    2D  %FS: 42 1 %  Ao Diam: 3 69 cm  EDV(Teich): 111 55 ml  EF(Teich): 72 96 %  ESV(Teich): 30 16 ml  IVSd: 1 3 cm  LA Area: 26 56 cm2  LA Diam: 3 55 cm  LVEDV MOD A4C: 134 93 ml  LVEF MOD A4C: 60 59 %  LVESV MOD A4C: 53 17 ml  LVIDd: 4 88 cm  LVIDs: 2 82 cm  LVLd A4C: 9 03 cm  LVLs A4C: 6 91 cm  LVPWd: 1 17 cm  RA Area: 20 63 cm2  RVIDd: 4 35 cm  SV MOD A4C: 81 75 ml  SV(Teich): 81 39 ml    CW  TR MaxP 4 mmHg  TR Vmax: 2 14 m/s    MM  TAPSE: 1 65 cm    [employer] Accredited Echocardiography Laboratory    Prepared and electronically signed by    Jhonathan Ojeda MD  Signed 2019 13:16:22

## 2019-07-31 NOTE — PROGRESS NOTES
Progress Note - Critical Care   Gustavo Goldstein 37 y o  male MRN: [id number]  Unit/Bed#: [facility] 515-01 Encounter: [id number]    Assessment:  24-year-old male with Lyme carditis causing complete heart block and now first-degree AV block  Plan:          Neuro:  Pain:  Significantly improved with discontinuing bed rest and addition of Flexeril   - decreased narcotic use   - no sedation                 CV:  Complete heart block   - resolved   - now with first-degree AV block AK interval > 600   - EP following   - pacer wires removed yesterday   - ultrasound showed no right groin pseudoaneurysm                 Lung:  Stable no acute issues                 GI:  Nausea   - 1 acute episode of nausea yesterday   - regular house diet   - no stress ulcer prophylaxis                 FEN:  Discontinued fluids   - replace electrolytes as needed   - regular house diet                 :  No acute issues   - voiding well with large urine output   - -1 9 L negative                 ID:  Lyme carditis   - Western blot positive   - Rocephin 2 g Q 12   - infectious disease following   - transition to p o  Doxy at discharge   - continue to monitor p r n  intervals                 Heme:  No acute issues                 Endo:  No acute issues                            Msk/Skin:  Out of bed to chair x3                 Disposition:  Transition to med [facility] tele today    Chief Complaint:  Back pain    HPI/24hr events:  Pacer rate was turned down yesterday and patient's intrinsic rhythm kicked in  Pacer wires were removed  Ultrasound groin negative for pseudoaneurysm  Prolonged AK interval greater than 600, monitor than unit overnight  Physical Exam:   Constitutional:  Well-developed well-nourished middle-aged male lying flat in bed in no acute distress  HEENT:  Normocephalic atraumatic  Pupils equal round reactive to light  Neck is supple with full range of motion no JVD noted  CV:  First-degree AV block on monitor   Appropriate S1-S2 no murmurs rubs noted  Pulm:  Clear to auscultation bilaterally with normal inspiratory effort no respiratory distress  Abdomen:  Soft, nontender nondistended  Extremities:  Moves all 4 extremities spontaneously  Right femoral groin sheath in place  Neuro:  GCS 15  Vitals:    19 2300 19 0000 19 0200 19 0300   BP: 106/66 120/65 104/60 120/69   BP Location:    Left arm   Pulse: 82 92 84 80   Resp: 16 17 18 18   Temp: 99 6 °F (37 6 °C)   99 5 °F (37 5 °C)   TempSrc:    Oral   SpO2: 95% 95% 94% 95%   Weight:       Height:                 Temperature:   Temp (24hrs), Av 3 °F (37 4 °C), Min:98 8 °F (37 1 °C), Max:99 7 °F (37 6 °C)    Current: Temperature: 99 5 °F (37 5 °C)    Weights:   IBW: 82 2 kg    Body mass index is 34 33 kg/m²  Weight (last 2 days)     Date/Time   Weight    19 1210   121 (267 42)              Hemodynamic Monitoring:  N/A     Non-Invasive/Invasive Ventilation Settings:  Respiratory    Lab Data (Last 4 hours)    None         O2/Vent Data (Last 4 hours)    None              No results found for: PHART, PUY5DUV, PO2ART, OQT7JVQ, B6VNJSXH, BEART, SOURCE  SpO2: SpO2: 95 %, SpO2 Activity: SpO2 Activity: At Rest, SpO2 Device: O2 Device: None (Room air)    Intake and Outputs:  I/O        07 -  0700 701 -  0700    P  O  700 600    I V  (mL/kg) 1711 7 (14 1) 641 7 (5 3)    IV Piggyback 75 50    Total Intake(mL/kg) 2486 7 (20 6) 1291 7 (10 7)    Urine (mL/kg/hr) 725 3275 (1 1)    Emesis/NG output  0    Total Output 725 3275    Net +1761 7 -1983 3          Unmeasured Emesis Occurrence  1 x        UOP: 1 1mL/kg/hour 3275mL in 24 hrs  - 1 9 L  Nutrition:        Diet Orders   (From admission, onward)             Start     Ordered    19 1203  Diet Regular; Regular House; Regular House  Diet effective now     Question Answer Comment   Diet Type Regular    Regular Regular House    Other Restriction(s): Regular House    RD to adjust diet per protocol? Yes        07/30/19 1202    07/29/19 1220  Room Service  Once     Question:  Type of Service  Answer:  Room Service - Appropriate with Assistance    07/29/19 1219              Regular house diet    Labs:   Results from last 7 days   Lab Units 07/31/19 0448 07/30/19  0518 07/29/19  0301 07/28/19  1212   WBC Thousand/uL 8 23 9 08 11 17* 7 68   HEMOGLOBIN g/dL 12 5 11 5* 11 6* 12 8   HEMATOCRIT % 37 4 34 7* 35 7* 38 9   PLATELETS Thousands/uL 305 265 280 355   NEUTROS PCT %  --  79*  --  72   MONOS PCT %  --  7  --  8    Results from last 7 days   Lab Units 07/31/19 0448 07/30/19 0518 07/29/19  0301  07/28/19  1153   SODIUM mmol/L 140 136 138   < >  --    POTASSIUM mmol/L 3 7 3 7 4 1   < >  --    CHLORIDE mmol/L 108 106 103   < >  --    CO2 mmol/L 26 23 28   < >  --    CO2, I-STAT mmol/L  --   --   --   --  21   BUN mg/dL 12 16 19   < >  --    CREATININE mg/dL 1 08 1 00 1 40*   < >  --    CALCIUM mg/dL 8 5 7 8* 7 8*   < >  --    GLUCOSE, ISTAT mg/dl  --   --   --   --  148*    < > = values in this interval not displayed  Results from last 7 days   Lab Units 07/31/19 0448 07/30/19  0518 07/29/19  0301   MAGNESIUM mg/dL 2 5 2 0 1 8          Results from last 7 days   Lab Units 07/28/19  1212   INR  1 17   PTT seconds 37         0   Lab Value Date/Time    TROPONINI 0 10 (H) 07/28/2019 1212       Imaging: No results found  I have personally reviewed pertinent reports  and I have personally reviewed pertinent films in PACS    EKG:  First-degree AV block    Micro:  Lab Results   Component Value Date    BLOODCX No Growth at 24 hrs  07/29/2019    BLOODCX No Growth at 24 hrs  07/29/2019       Allergies:    Allergies   Allergen Reactions    Penicillins Rash     Childhood       Medications:   Scheduled Meds:  Current Facility-Administered Medications:  acetaminophen 650 mg Oral Q4H PRN Lizett Loaiza PA-C    baclofen 10 mg Oral TID Lizett Loaiza PA-C    cefTRIAXone 2,000 mg Intravenous Q24H Giselle FAJARDO Julieta Adams PA-C Last Rate: Stopped (07/30/19 1431)   cyclobenzaprine 10 mg Oral TID PRN CORINNE Corcoran    enoxaparin 40 mg Subcutaneous Daily RONEN Peterson-GYPSY    HYDROmorphone 1 mg Intravenous Q4H PRN Mona Munoz, PA-GYPSY    lidocaine 1 patch Topical Daily Sophia Serivonne, PA-GYPSY    melatonin 3 mg Oral HS Mona Munoz, PA-GYPSY    oxyCODONE 10 mg Oral Q4H PRN Mona Munoz, PA-GYPSY    oxyCODONE 5 mg Oral Q4H PRN Mona Munoz, PA-GYPSY    senna-docusate sodium 2 tablet Oral BID CORINNE Corcoran    simethicone 80 mg Oral Q6H PRN Sophia Yañez, PA-GYPSY    sodium chloride (PF) 3 mL Intravenous PRN Mona Munoz, PA-GYPSY    sodium chloride 100 mL/hr Intravenous Continuous Mona Munoz PA-C Last Rate: Stopped (07/30/19 1207)     Continuous Infusions:  sodium chloride 100 mL/hr Last Rate: Stopped (07/30/19 1207)     PRN Meds:    acetaminophen 650 mg Q4H PRN   cyclobenzaprine 10 mg TID PRN   HYDROmorphone 1 mg Q4H PRN   oxyCODONE 10 mg Q4H PRN   oxyCODONE 5 mg Q4H PRN   simethicone 80 mg Q6H PRN   sodium chloride (PF) 3 mL PRN       VTE Pharmacologic Prophylaxis: Enoxaparin (Lovenox)  VTE Mechanical Prophylaxis: sequential compression device    Invasive lines and devices: Invasive Devices     Peripheral Intravenous Line            Peripheral IV 07/28/19 Left Antecubital 2 days    Peripheral IV 07/28/19 Right Antecubital 2 days          Airway            Supraglottic Airway LMA 4 216 days                   Counseling / Coordination of Care  Total Critical Care time spent 0 minutes excluding procedures, teaching and family updates  Code Status: Level 1 - Full Code     Portions of the record may have been created with voice recognition software  Occasional wrong word or "sound a like" substitutions may have occurred due to the inherent limitations of voice recognition software  Read the chart carefully and recognize, using context, where substitutions have occurred       Mona Munoz PA-C

## 2019-08-01 VITALS
TEMPERATURE: 97.7 F | HEIGHT: 74 IN | BODY MASS INDEX: 34.32 KG/M2 | OXYGEN SATURATION: 96 % | WEIGHT: 267.42 LBS | SYSTOLIC BLOOD PRESSURE: 147 MMHG | HEART RATE: 79 BPM | DIASTOLIC BLOOD PRESSURE: 81 MMHG | RESPIRATION RATE: 16 BRPM

## 2019-08-01 PROBLEM — K59.01 SLOW TRANSIT CONSTIPATION: Status: RESOLVED | Noted: 2019-07-31 | Resolved: 2019-08-01

## 2019-08-01 PROCEDURE — 99222 1ST HOSP IP/OBS MODERATE 55: CPT | Performed by: INTERNAL MEDICINE

## 2019-08-01 PROCEDURE — 99233 SBSQ HOSP IP/OBS HIGH 50: CPT | Performed by: INTERNAL MEDICINE

## 2019-08-01 PROCEDURE — NC001 PR NO CHARGE: Performed by: INTERNAL MEDICINE

## 2019-08-01 RX ORDER — DOXYCYCLINE 100 MG/1
100 TABLET ORAL EVERY 12 HOURS
Qty: 32 TABLET | Refills: 0 | Status: SHIPPED | OUTPATIENT
Start: 2019-08-01 | End: 2019-08-17

## 2019-08-01 RX ADMIN — ENOXAPARIN SODIUM 40 MG: 40 INJECTION SUBCUTANEOUS at 08:28

## 2019-08-01 RX ADMIN — CEFTRIAXONE 2000 MG: 2 INJECTION, POWDER, FOR SOLUTION INTRAMUSCULAR; INTRAVENOUS at 11:52

## 2019-08-01 RX ADMIN — BACLOFEN 10 MG: 10 TABLET ORAL at 08:29

## 2019-08-01 NOTE — DISCHARGE SUMMARY
IMR Discharge Summary - Medical Yola Remy 37 y o  male MRN: [id number]    [address] [facility]  Room / Bed: [facility] 522/[facility] 036-02 Encounter: [id number]    BRIEF OVERVIEW    Admitting Provider: Claudean Perry, MD  Discharge Provider: No att  providers found  Primary Care Physician at Discharge: [address] [place] [address] [address]  Admission Date: 7/29/2019     Discharge Date: 8/1/2019  3:33 PM    Hospital Course  Yola Remy is a 17-year-old man with past medical history significant for several herniated discs in the lumbar spine who presented to [address] [place] [address] ED on 07/28/2019 complaining of fatigue, weakness, lightheadedness, shortness of breath x2 days with chest pain  Noted in the ED to be bradycardic in 30s with EKG finding significant for complete third-degree heart block  Empirically treated with Rocephin for Lyme carditis  Repeat EKG showed abnormal ST segments and initial troponin elevated to 0 1, patient taken to cath lab emergently  Catheterization demonstrated normal coronary arteries, temporary pacing wires placed in right groin, and patient admitted to ICU, where it was determined he should be transferred to [facility] for EP eval     Patient was transferred 07/29/2019  Was continued on IV Rocephin  Require pacing until 07/30/2019, wherein his intrinsic pacemaker took over  He was initially found to be in first-degree AV block and monitored overnight  MD interval initially greater than 600, decreased to 270-280 at time of discharge  Lyme antibodies and Western blot were both overwhelmingly positive  Patient was transferred to regular medical floor 07/31/2019  His overall condition continued to improve on IV antibiotics  He was on the regular medical floor for 1 night, during which time he had no acute rhythm events overnight on telemetry monitoring  On day of discharge, he was transitioned from Rocephin IV to p o   Doxycycline as advised by Infectious Disease for total treatment time of 21 days  At time of discharge, telemetry monitoring reveals normal sinus rhythm with heart rate in the 80s  Patient specifically denies chest pain, palpitations, cough, shortness of breath, wheezing, abdominal pain, nausea, vomiting, diarrhea, constipation, joint aches or pains, fevers, chills, headaches, dizziness  Presenting Problem/History of Present Illness  Principal Problem:    Lyme carditis  Active Problems:    Chronic midline low back pain without sciatica    First degree AV block  Resolved Problems:    Complete heart block (HCC)    Slow transit constipation    Physical Exam   Constitutional: He is oriented to person, place, and time  He appears well-developed and well-nourished  No distress  HENT:   Head: Normocephalic and atraumatic  Eyes: Pupils are equal, round, and reactive to light  Conjunctivae and EOM are normal  No scleral icterus  Neck: Neck supple  Cardiovascular: Normal rate, regular rhythm, normal heart sounds and intact distal pulses  Exam reveals no gallop and no friction rub  No murmur heard  Pulmonary/Chest: Effort normal and breath sounds normal  No respiratory distress  He has no wheezes  He has no rales  Abdominal: Soft  Bowel sounds are normal  He exhibits no distension and no mass  There is no tenderness  There is no guarding  Musculoskeletal: Normal range of motion  He exhibits no edema, tenderness or deformity  Lymphadenopathy:     He has no cervical adenopathy  Neurological: He is alert and oriented to person, place, and time  No cranial nerve deficit  Skin: Skin is warm and dry  Capillary refill takes less than 2 seconds  No rash noted  He is not diaphoretic  No pallor  Psychiatric: He has a normal mood and affect  His behavior is normal    Vitals reviewed          Diagnostic Procedures Performed  Imaging Studies:  Portable CXR obtained 07/20/2019 negativefor any acute intrapulmonary process  Pertinent Labs:  Lyme serologies explained above, the rest of his lab work, including BMP and CBC, are all within normal limits at time of discharge  Therapeutic Procedures Performed  Cardiac catheterization detailed above    Test Results Pending at Discharge:  None     Medications     Medication List to be Continued at Discharge  Discharge Medication List as of 8/1/2019  3:04 PM      CONTINUE these medications which have NOT CHANGED    Details   acetaminophen (TYLENOL) 500 mg tablet Take 1,000 mg by mouth every 6 (six) hours as needed for mild pain, Historical Med      ibuprofen (MOTRIN) 600 mg tablet Take by mouth every 6 (six) hours as needed for mild pain, Historical Med           Discharge Medication List as of 8/1/2019  3:04 PM      START taking these medications    Details   doxycycline (ADOXA) 100 MG tablet Take 1 tablet (100 mg total) by mouth every 12 (twelve) hours for 16 days LAST DOSE 08/17/2019, Starting Thu 8/1/2019, Until Sat 8/17/2019, Normal           Discharge Medication List as of 8/1/2019  3:04 PM          Allergies  Allergies   Allergen Reactions    Penicillins Rash     Childhood     Discharge Diet: regular diet  Activity restrictions: No were or strenuous exercise for 1 week or until cleared by primary care provider  Discharge Condition: good  Discharged With Lines: no    Discharge Disposition: [address] / Family Member Name: Donal Nissen  Phone Number: [phone]    Outpatient Follow-Up  Patient is advised to follow up with his primary care provider, Dr Dewayne Hagan, within 1 week of discharge  Code Status: Prior  Advance Directive and Living Will: <no information>  Power of :    POLST:      Discharge  Statement   I spent 30 minutes minutes discharging the patient  This time was spent on the day of discharge  I had direct contact with the patient on the day of discharge   Additional documentation is required if more than 30 minutes were spent on discharge     Ashley Toth DO

## 2019-08-01 NOTE — DISCHARGE INSTRUCTIONS
Please follow-up with your primary care provider within 1 week of discharge  No strenuous activity for the next 1 week  Please take your doxycycline 100 mg every 12 hours for a total of 21 days, ending on 08/17/2019

## 2019-08-01 NOTE — PROGRESS NOTES
Progress Note - Infectious Disease   Skipper Bill 37 y o  male MRN: [id number]  Unit/Bed#: [address] 522-01 Encounter: [id number]      Impression/Recommendations:  1   Complete heart block   Due to Lyme disease   Cardiac catheterization unremarkable   Improving with IV antibiotics  Rec:  ? OK from ID for D/C on doxycycline 100mg PO Q12 with plan to complete 21 days total of antibiotics through 19     2   Lyme disease   In setting of # 1   Oositive Lyme screen and Western blot     Rec:  ? Continue antibiotics as above     Discussed the above plan in detail with the patient, his wife, and Dr Eddi Nowak  The patient is stable from an ID standpoint for D/C  We will sign off for now  Please call with new questions      Antibiotics:  Ceftriaxone # 5    Subjective:  Patient seen on AM rounds  Denies fevers, chills, sweats, nausea, vomiting, or diarrhea  Ambulating in the hallways with wife without difficulty  24 Hour Events:  No documented fevers, chills, sweats, nausea, vomiting, or diarrhea  Objective:  Vitals:  Temp:  [97 7 °F (36 5 °C)-98 9 °F (37 2 °C)] 97 7 °F (36 5 °C)  HR:  [69-81] 79  Resp:  [16-18] 16  BP: (119-153)/(65-81) 147/81  SpO2:  [95 %-96 %] 96 %  Temp (24hrs), Av 3 °F (36 8 °C), Min:97 7 °F (36 5 °C), Max:98 9 °F (37 2 °C)  Current: Temperature: 97 7 °F (36 5 °C)    Physical Exam:   General:  No acute distress  Psychiatric:  Awake and alert  Pulmonary:  Normal respiratory excursion without accessory muscle use  Abdomen:  Soft, nontender  Extremities:  No edema  Skin:  No rashes    Lab Results:  I have personally reviewed pertinent labs    Results from last 7 days   Lab Units 19  0448 19  0518 19  0301  19  1153   POTASSIUM mmol/L 3 7 3 7 4 1   < >  --    CHLORIDE mmol/L 108 106 103   < >  --    CO2 mmol/L 26 23 28   < >  --    CO2, I-STAT mmol/L  --   --   --   --  21   BUN mg/dL 12 16 19   < >  --    CREATININE mg/dL 1 08 1 00 1 40*   < >  --    EGFR ml/min/1 73sq m 85 93 62   < > 62   GLUCOSE, ISTAT mg/dl  --   --   --   --  148*   CALCIUM mg/dL 8 5 7 8* 7 8*   < >  --     < > = values in this interval not displayed  Results from last 7 days   Lab Units 07/31/19  0448 07/30/19  0518 07/29/19  0301   WBC Thousand/uL 8 23 9 08 11 17*   HEMOGLOBIN g/dL 12 5 11 5* 11 6*   PLATELETS Thousands/uL 305 265 280     Results from last 7 days   Lab Units 07/29/19  0536 07/29/19  0533   BLOOD CULTURE  No Growth at 72 hrs  No Growth at 72 hrs  Imaging Studies:   I have personally reviewed pertinent imaging study reports and images in PACS  EKG, Pathology, and Other Studies:   I have personally reviewed pertinent reports

## 2019-08-01 NOTE — PROGRESS NOTES
S:   Kizzy Bhandari is a 37 y o  M with no significant PMH who is presenting for ICU step down on 7/31/19 due to fatigue and dizziness  Patient is doing well, no acute events overnight  No headaches, no SOB, no chest pain or palpitations, no N/V/D, no fevers/ chills/ night sweats, tolerating oral diet well, no constipation, no pins/ needles/ or numbness in extremities  O:   Vitals: stable, wnl   08/01/19 0722  98 2 °F (36 8 °C)  74  18  153/74  --  96 %  --  Sitting     PE:   Constitutional: alert and oriented to person/ place/ and time   HEENT: normocephalic, moist mucous membranes, PERRL   Cardiovascular: normal s1/s2, no murmurs, RRR  Respiratory: no crackles, lungs clear bilaterally   Abdominal: bowel sounds present  Skin: no visible rashes  Psychiatric: normal affect, calm     Labs: no new labs     Assessment: Kizzy Bhandari is a 37 y o  M with no significant PMH who is presenting for ICU step down on 7/31/19 due to fatigue and dizziness secondary to lyme 3rd degree heart block  1  Lyme carditis  Patient initially presented with fatigue and dizziness started on 7/25/19 and became progressively worse with time  Went into ED at [facility] and it was found that the lyme antibody was positive on 7/28/19 and EKG found a complete heart block on 7/28/19  Patient had to be admitted to ICU due to severe bradycardia in the 30s  Patient given pacemarker to help with 3rd degree heart block in right groin and IV ceftriaxone for the borrelia burgdoferi  And symptoms gradually resolved and pulse is no longer bradycardic  EKG on 7/30/19 revealed 1st degree AV block and prolonged SD interval  Patient was able to be de-escalated to regular floor on 7/31/19 after transfer to [facility]  Patient is doing well, currently asymptomatic  No other medical conditions being managed  Seems medically stable for discharge  Plan:   1   Lyme carditis:   -as per ID, can continue ceftriaxone  -medically stable  Can de-escalate to doxycycline upon discharge 21 days as per [address] pharmacist guidelines   -possible discharge today  -no other medical conditions being managed

## 2019-08-03 LAB
BACTERIA BLD CULT: NORMAL
BACTERIA BLD CULT: NORMAL

## 2019-08-05 ENCOUNTER — TELEPHONE (OUTPATIENT)
Dept: CARDIOLOGY CLINIC | Facility: CLINIC | Age: 39
End: 2019-08-05

## 2019-08-08 ENCOUNTER — TRANSCRIBE ORDERS (OUTPATIENT)
Dept: ADMINISTRATIVE | Facility: HOSPITAL | Age: 39
End: 2019-08-08

## 2019-08-08 DIAGNOSIS — G47.33 OBSTRUCTIVE SLEEP APNEA (ADULT) (PEDIATRIC): Primary | ICD-10-CM

## 2019-09-17 ENCOUNTER — HOSPITAL ENCOUNTER (OUTPATIENT)
Dept: SLEEP CENTER | Facility: CLINIC | Age: 39
Discharge: HOME | End: 2019-09-17
Payer: COMMERCIAL

## 2019-09-17 DIAGNOSIS — G47.33 OBSTRUCTIVE SLEEP APNEA (ADULT) (PEDIATRIC): ICD-10-CM

## 2019-09-17 PROCEDURE — G0399 HOME SLEEP TEST/TYPE 3 PORTA: HCPCS

## 2019-09-23 ENCOUNTER — TELEPHONE (OUTPATIENT)
Dept: SLEEP CENTER | Facility: CLINIC | Age: 39
End: 2019-09-23

## 2019-09-23 NOTE — TELEPHONE ENCOUNTER
Left message that study was read & patient is to call Dr Betito Chavira office to discuss results- # provided

## 2021-03-19 ENCOUNTER — TELEPHONE (OUTPATIENT)
Dept: NEUROSURGERY | Facility: CLINIC | Age: 41
End: 2021-03-19

## 2021-03-19 NOTE — TELEPHONE ENCOUNTER
3/19/21  RECEIVED WRITTEN NOTE  PT SELF REFERRING FOR BACK  NO RECENT IMAGING  L/M FOR PT TO RETURN CALL

## 2021-04-06 NOTE — DISCHARGE SUMMARY
Discharge Summary Outpatient Procedure Podiatry -   Tanmay Garcia 34 y o  male MRN: [id number]  Unit/Bed#: OR POOL Encounter: [id number]    Admission Date: 12/26/2018     Admitting Diagnosis: Plantar fascial fibromatosis [M72 2]    Discharge Diagnosis: same    Procedures Performed: RELEASE FASCIA PLANTAR/FASCIOTOMY ENDOSCOPIC (EPF): 86458 (CPT®)    Complications: none    Condition at Discharge: stable    Discharge instructions/Information to patient and family:   See after visit summary for information provided to patient and family  Provisions for Follow-Up Care/Important appointments:  See after visit summary for information related to follow-up care and any pertinent home health orders  Discharge Medications:  See after visit summary for reconciled discharge medications provided to patient and family 
Satisfactory

## 2021-04-22 ENCOUNTER — TRANSCRIBE ORDERS (OUTPATIENT)
Dept: ADMINISTRATIVE | Facility: HOSPITAL | Age: 41
End: 2021-04-22

## 2021-04-22 DIAGNOSIS — M51.26 OTHER INTERVERTEBRAL DISC DISPLACEMENT, LUMBAR REGION: Primary | ICD-10-CM

## 2021-11-11 ENCOUNTER — NURSE TRIAGE (OUTPATIENT)
Dept: OTHER | Facility: OTHER | Age: 41
End: 2021-11-11

## 2021-11-11 DIAGNOSIS — Z20.828 EXPOSURE TO SARS-ASSOCIATED CORONAVIRUS: Primary | ICD-10-CM

## 2021-11-13 PROCEDURE — U0005 INFEC AGEN DETEC AMPLI PROBE: HCPCS | Performed by: FAMILY MEDICINE

## 2021-11-13 PROCEDURE — U0003 INFECTIOUS AGENT DETECTION BY NUCLEIC ACID (DNA OR RNA); SEVERE ACUTE RESPIRATORY SYNDROME CORONAVIRUS 2 (SARS-COV-2) (CORONAVIRUS DISEASE [COVID-19]), AMPLIFIED PROBE TECHNIQUE, MAKING USE OF HIGH THROUGHPUT TECHNOLOGIES AS DESCRIBED BY CMS-2020-01-R: HCPCS | Performed by: FAMILY MEDICINE

## 2021-11-15 LAB — SARS-COV-2 RNA RESP QL NAA+PROBE: NEGATIVE

## 2023-01-24 ENCOUNTER — OFFICE VISIT (OUTPATIENT)
Dept: SURGERY | Facility: CLINIC | Age: 43
End: 2023-01-24

## 2023-01-24 VITALS — HEIGHT: 74 IN | BODY MASS INDEX: 34.01 KG/M2 | WEIGHT: 265 LBS

## 2023-01-24 DIAGNOSIS — R22.9 SUBCUTANEOUS MASS: Primary | ICD-10-CM

## 2023-01-24 NOTE — PROGRESS NOTES
Assessment/Plan: Patient presents with a subcutaneous mass in the right upper quadrant of the abdomen  Is been present over the last 4 months  He complains of pain with pressure  Evaluation reveals a 1 5 cm hard mobile subcutaneous mass in the right upper quadrant  It is approximately 1 inch beneath the skin surface  Excision was recommended as it seems firm in nature  Patient prefers to return at a later date for this procedure  All questions answered  There are no diagnoses linked to this encounter  Subjective:      Patient ID: Elenita Whitney is a 38 y o  male  Patient presents for evaluation of a lump on his right flank  He has had the lump for 4 months  He has pain with pressure  Denies size change  The following portions of the patient's history were reviewed and updated as appropriate:     He  has a past medical history of Arthritis, Back pain, Cancer ([address] ), DDD (degenerative disc disease), lumbar, Foot pain, left, History of herniated intervertebral disc, Obesity, Scratches, Seasonal allergies, and Snores  He  has a past surgical history that includes Septoplasty; Vasectomy; Skin cancer excision; and pr endoscopic plantar fasciotomy (Left, 12/26/2018)  His family history is not on file  He  reports that he has quit smoking  He has never used smokeless tobacco  He reports current alcohol use  He reports that he does not use drugs  Current Outpatient Medications   Medication Sig Dispense Refill   • acetaminophen (TYLENOL) 500 mg tablet Take 1,000 mg by mouth every 6 (six) hours as needed for mild pain     • ibuprofen (MOTRIN) 600 mg tablet Take by mouth every 6 (six) hours as needed for mild pain       No current facility-administered medications for this visit  He is allergic to penicillins       Review of Systems   Constitutional: Negative  Negative for activity change  HENT: Negative  Eyes: Negative  Respiratory: Negative  Cardiovascular: Negative  Gastrointestinal: Negative  Endocrine: Negative  Genitourinary: Negative  Musculoskeletal: Negative  Skin: Negative  Allergic/Immunologic: Negative  Neurological: Negative  Psychiatric/Behavioral: Negative for agitation, behavioral problems and confusion  The patient is not nervous/anxious  Objective:      Ht 6' 2" (1 88 m)   Wt 120 kg (265 lb)   BMI 34 02 kg/m²          Physical Exam  Constitutional:       Appearance: He is well-developed  He is not diaphoretic  HENT:      Head: Normocephalic and atraumatic  Eyes:      General: No scleral icterus  Right eye: No discharge  Left eye: No discharge  Extraocular Movements: Extraocular movements intact  Conjunctiva/sclera: Conjunctivae normal    Neck:      Thyroid: No thyromegaly  Trachea: No tracheal deviation  Cardiovascular:      Heart sounds: No murmur heard  No friction rub  Pulmonary:      Effort: Pulmonary effort is normal  No respiratory distress  Breath sounds: No stridor  No wheezing  Chest:      Chest wall: No tenderness  Abdominal:      General: There is no distension  Palpations: There is no mass  Tenderness: There is no abdominal tenderness  There is no guarding or rebound  Musculoskeletal:         General: No tenderness  Right lower leg: No edema  Left lower leg: No edema  Lymphadenopathy:      Cervical: No cervical adenopathy  Skin:     General: Skin is warm and dry  Findings: No erythema or rash  Comments: 1 5 cm subcutaneous mass noted in the right upper quadrant to the abdomen  It is mobile and firm  Neurological:      Mental Status: He is alert and oriented to person, place, and time  Cranial Nerves: No cranial nerve deficit        Coordination: Coordination normal    Psychiatric:         Behavior: Behavior normal          Judgment: Judgment normal

## 2023-02-06 ENCOUNTER — PROCEDURE VISIT (OUTPATIENT)
Dept: SURGERY | Facility: CLINIC | Age: 43
End: 2023-02-06

## 2023-02-06 DIAGNOSIS — R22.9 SUBCUTANEOUS MASS: Primary | ICD-10-CM

## 2023-02-06 NOTE — PROGRESS NOTES
Assessment/Plan: Patient presents with a subcutaneous mass in the right upper quadrant deep subcutaneous tissues  Measures 3 x 2 cm in size  This area was marked preoperatively  The area was prepped and draped  Quarter percent Marcaine with epinephrine was utilized throughout the procedure  Incision was made carried to the skin subtenons tissue  The deep subcutaneous tissue lipomatous mass was found  This was enucleated  This measures 3 x 2 cm in size  Margins are 0  The wound was then closed with 3-0 Vicryl subcutaneous and 4-0 Monocryl sutures  Dermabond was applied  The patient tolerated this procedure well  Sponge and recovery x2  There are no diagnoses linked to this encounter  Subjective:      Patient ID: Rosalino Cummings is a 38 y o  male  Patient presents for excision of a SQ mass RUQ  The following portions of the patient's history were reviewed and updated as appropriate:     He  has a past medical history of Arthritis, Back pain, Cancer ([address] ), DDD (degenerative disc disease), lumbar, Foot pain, left, History of herniated intervertebral disc, Obesity, Scratches, Seasonal allergies, and Snores  He  has a past surgical history that includes Septoplasty; Vasectomy; Skin cancer excision; and pr endoscopic plantar fasciotomy (Left, 12/26/2018)  His family history is not on file  He  reports that he has quit smoking  His smoking use included cigarettes  He has never used smokeless tobacco  He reports current alcohol use  He reports that he does not use drugs  Current Outpatient Medications   Medication Sig Dispense Refill   • acetaminophen (TYLENOL) 500 mg tablet Take 1,000 mg by mouth every 6 (six) hours as needed for mild pain     • ibuprofen (MOTRIN) 600 mg tablet Take by mouth every 6 (six) hours as needed for mild pain       No current facility-administered medications for this visit  He is allergic to penicillins       Review of Systems   Constitutional: Negative  Negative for activity change  HENT: Negative  Eyes: Negative  Respiratory: Negative  Cardiovascular: Negative  Gastrointestinal: Negative  Endocrine: Negative  Genitourinary: Negative  Musculoskeletal: Negative  Skin: Negative  Allergic/Immunologic: Negative  Neurological: Negative  Psychiatric/Behavioral: Negative for agitation, behavioral problems and confusion  The patient is not nervous/anxious  Objective: There were no vitals taken for this visit           Physical Exam  Biopsy    Date/Time: 2/6/2023 4:50 PM  Performed by: Rizwan Matias MD  Authorized by: Rizwan Matias MD   Universal Protocol:  Patient understanding: patient states understanding of the procedure being performed  Patient identity confirmed: verbally with patient      Procedure Details - Lesion Biopsy:     Initial size (mm):  30    Final defect size (mm):  30    Malignancy: benign lesion

## 2023-02-06 NOTE — LETTER
February 6, 2023     Nikko Mcahado, [address] [address]    Patient: Ines Benedict   YOB: 1980   Date of Visit: 2/6/2023       Dear Dr Didi Jones: Thank you for referring Ines Benedict to me for evaluation  Below are my notes for this consultation  If you have questions, please do not hesitate to call me  I look forward to following your patient along with you  Sincerely,        Reece Jones MD        CC: No Recipients  Reece Jones MD  2/6/2023  4:51 PM  Incomplete  Assessment/Plan: Patient presents with a subcutaneous mass in the right upper quadrant deep subcutaneous tissues  Measures 3 x 2 cm in size  This area was marked preoperatively  The area was prepped and draped  Quarter percent Marcaine with epinephrine was utilized throughout the procedure  Incision was made carried to the skin subtenons tissue  The deep subcutaneous tissue lipomatous mass was found  This was enucleated  This measures 3 x 2 cm in size  Margins are 0  The wound was then closed with 3-0 Vicryl subcutaneous and 4-0 Monocryl sutures  Dermabond was applied  The patient tolerated this procedure well  Sponge and recovery x2  There are no diagnoses linked to this encounter  Subjective:     Patient ID: Ines Benedict is a 38 y o  male  Patient presents for excision of a SQ mass RUQ  The following portions of the patient's history were reviewed and updated as appropriate:     He  has a past medical history of Arthritis, Back pain, Cancer ([address] ), DDD (degenerative disc disease), lumbar, Foot pain, left, History of herniated intervertebral disc, Obesity, Scratches, Seasonal allergies, and Snores  He  has a past surgical history that includes Septoplasty; Vasectomy; Skin cancer excision; and pr endoscopic plantar fasciotomy (Left, 12/26/2018)  His family history is not on file  He  reports that he has quit smoking   His smoking use included cigarettes  He has never used smokeless tobacco  He reports current alcohol use  He reports that he does not use drugs  Current Outpatient Medications   Medication Sig Dispense Refill   • acetaminophen (TYLENOL) 500 mg tablet Take 1,000 mg by mouth every 6 (six) hours as needed for mild pain     • ibuprofen (MOTRIN) 600 mg tablet Take by mouth every 6 (six) hours as needed for mild pain       No current facility-administered medications for this visit  He is allergic to penicillins       Review of Systems   Constitutional: Negative  Negative for activity change  HENT: Negative  Eyes: Negative  Respiratory: Negative  Cardiovascular: Negative  Gastrointestinal: Negative  Endocrine: Negative  Genitourinary: Negative  Musculoskeletal: Negative  Skin: Negative  Allergic/Immunologic: Negative  Neurological: Negative  Psychiatric/Behavioral: Negative for agitation, behavioral problems and confusion  The patient is not nervous/anxious  Objective: There were no vitals taken for this visit          Physical Exam Biopsy    Date/Time: 2/6/2023 4:50 PM  Performed by: Laurie Guerrero MD  Authorized by: Laurie Guerrero MD   Universal Protocol:  Patient understanding: patient states understanding of the procedure being performed  Patient identity confirmed: verbally with patient      Procedure Details - Lesion Biopsy:     Initial size (mm):  30    Final defect size (mm):  30    Malignancy: benign lesion

## 2023-02-27 ENCOUNTER — OFFICE VISIT (OUTPATIENT)
Dept: SURGERY | Facility: CLINIC | Age: 43
End: 2023-02-27

## 2023-02-27 DIAGNOSIS — R22.9 SUBCUTANEOUS MASS: Primary | ICD-10-CM

## 2023-02-27 NOTE — PROGRESS NOTES
Assessment/Plan: Patient is status post excision of a lipoma in the right upper quadrant of the abdomen  He returns today for follow-up visit  The incision looks well  No evidence for infection  Activity instructions provided  There are no diagnoses linked to this encounter  Subjective:      Patient ID: Kemar Galindo is a 38 y o  male  Presents for recheck , RUQ mass excision 2/6/2023  Doing well, no complaints  The following portions of the patient's history were reviewed and updated as appropriate:     He  has a past medical history of Arthritis, Back pain, Cancer ([address] ), DDD (degenerative disc disease), lumbar, Foot pain, left, History of herniated intervertebral disc, Obesity, Scratches, Seasonal allergies, and Snores  He  has a past surgical history that includes Septoplasty; Vasectomy; Skin cancer excision; and pr endoscopic plantar fasciotomy (Left, 12/26/2018)  His family history is not on file  He  reports that he has quit smoking  His smoking use included cigarettes  He has never used smokeless tobacco  He reports current alcohol use  He reports that he does not use drugs  Current Outpatient Medications   Medication Sig Dispense Refill   • acetaminophen (TYLENOL) 500 mg tablet Take 1,000 mg by mouth every 6 (six) hours as needed for mild pain     • ibuprofen (MOTRIN) 600 mg tablet Take by mouth every 6 (six) hours as needed for mild pain       No current facility-administered medications for this visit  He is allergic to penicillins       Review of Systems      Objective: Incisions clean healing well  No evidence for infection  There were no vitals taken for this visit  Physical Exam  Incisions clean healing well  No evidence for infection

## 2023-12-22 ENCOUNTER — APPOINTMENT (OUTPATIENT)
Dept: LAB | Facility: CLINIC | Age: 43
End: 2023-12-22
Payer: COMMERCIAL

## 2023-12-22 ENCOUNTER — TRANSCRIBE ORDERS (OUTPATIENT)
Dept: ADMINISTRATIVE | Facility: HOSPITAL | Age: 43
End: 2023-12-22

## 2023-12-22 ENCOUNTER — HOSPITAL ENCOUNTER (OUTPATIENT)
Dept: RADIOLOGY | Facility: HOSPITAL | Age: 43
Discharge: HOME | End: 2023-12-22
Payer: COMMERCIAL

## 2023-12-22 DIAGNOSIS — R10.13 EPIGASTRIC PAIN: Primary | ICD-10-CM

## 2023-12-22 DIAGNOSIS — R10.13 ABDOMINAL PAIN, EPIGASTRIC: ICD-10-CM

## 2023-12-22 DIAGNOSIS — R10.13 EPIGASTRIC PAIN: ICD-10-CM

## 2023-12-22 LAB
ALBUMIN SERPL BCP-MCNC: 4.3 G/DL (ref 3.5–5)
ALP SERPL-CCNC: 53 U/L (ref 34–104)
ALT SERPL W P-5'-P-CCNC: 24 U/L (ref 7–52)
AMYLASE SERPL-CCNC: 93 IU/L (ref 29–103)
ANION GAP SERPL CALCULATED.3IONS-SCNC: 5 MMOL/L
AST SERPL W P-5'-P-CCNC: 21 U/L (ref 13–39)
BASOPHILS # BLD AUTO: 0.03 THOUSANDS/ÂΜL (ref 0–0.1)
BASOPHILS NFR BLD AUTO: 1 % (ref 0–1)
BILIRUB SERPL-MCNC: 0.53 MG/DL (ref 0.2–1)
BUN SERPL-MCNC: 27 MG/DL (ref 5–25)
CALCIUM SERPL-MCNC: 8.9 MG/DL (ref 8.4–10.2)
CHLORIDE SERPL-SCNC: 103 MMOL/L (ref 96–108)
CO2 SERPL-SCNC: 29 MMOL/L (ref 21–32)
CREAT SERPL-MCNC: 1.08 MG/DL (ref 0.6–1.3)
EOSINOPHIL # BLD AUTO: 0.03 THOUSAND/ÂΜL (ref 0–0.61)
EOSINOPHIL NFR BLD AUTO: 1 % (ref 0–6)
ERYTHROCYTE [DISTWIDTH] IN BLOOD BY AUTOMATED COUNT: 11.9 % (ref 11.6–15.1)
GFR SERPL CREATININE-BSD FRML MDRD: 82 ML/MIN/1.73SQ M
GLUCOSE P FAST SERPL-MCNC: 93 MG/DL (ref 65–99)
HCT VFR BLD AUTO: 44.1 % (ref 36.5–49.3)
HGB BLD-MCNC: 15.1 G/DL (ref 12–17)
IMM GRANULOCYTES # BLD AUTO: 0.01 THOUSAND/UL (ref 0–0.2)
IMM GRANULOCYTES NFR BLD AUTO: 0 % (ref 0–2)
LIPASE SERPL-CCNC: 11 U/L (ref 11–82)
LYMPHOCYTES # BLD AUTO: 1.54 THOUSANDS/ÂΜL (ref 0.6–4.47)
LYMPHOCYTES NFR BLD AUTO: 33 % (ref 14–44)
MCH RBC QN AUTO: 30.7 PG (ref 26.8–34.3)
MCHC RBC AUTO-ENTMCNC: 34.2 G/DL (ref 31.4–37.4)
MCV RBC AUTO: 90 FL (ref 82–98)
MONOCYTES # BLD AUTO: 0.36 THOUSAND/ÂΜL (ref 0.17–1.22)
MONOCYTES NFR BLD AUTO: 8 % (ref 4–12)
NEUTROPHILS # BLD AUTO: 2.67 THOUSANDS/ÂΜL (ref 1.85–7.62)
NEUTS SEG NFR BLD AUTO: 57 % (ref 43–75)
NRBC BLD AUTO-RTO: 0 /100 WBCS
PLATELET # BLD AUTO: 286 THOUSANDS/UL (ref 149–390)
PMV BLD AUTO: 10.9 FL (ref 8.9–12.7)
POTASSIUM SERPL-SCNC: 4.2 MMOL/L (ref 3.5–5.3)
PROT SERPL-MCNC: 7.3 G/DL (ref 6.4–8.4)
RBC # BLD AUTO: 4.92 MILLION/UL (ref 3.88–5.62)
SODIUM SERPL-SCNC: 137 MMOL/L (ref 135–147)
WBC # BLD AUTO: 4.64 THOUSAND/UL (ref 4.31–10.16)

## 2023-12-22 PROCEDURE — 85025 COMPLETE CBC W/AUTO DIFF WBC: CPT

## 2023-12-22 PROCEDURE — 74160 CT ABDOMEN W/CONTRAST: CPT

## 2023-12-22 PROCEDURE — 36415 COLL VENOUS BLD VENIPUNCTURE: CPT

## 2023-12-22 PROCEDURE — 80053 COMPREHEN METABOLIC PANEL: CPT

## 2023-12-22 PROCEDURE — 82150 ASSAY OF AMYLASE: CPT

## 2023-12-22 PROCEDURE — 83690 ASSAY OF LIPASE: CPT

## 2023-12-22 RX ADMIN — IOHEXOL 100 ML: 350 INJECTION, SOLUTION INTRAVENOUS at 14:04

## 2023-12-28 ENCOUNTER — OFFICE VISIT (OUTPATIENT)
Dept: GASTROENTEROLOGY | Facility: CLINIC | Age: 43
End: 2023-12-28
Payer: COMMERCIAL

## 2023-12-28 ENCOUNTER — APPOINTMENT (OUTPATIENT)
Dept: LAB | Facility: AMBULARY SURGERY CENTER | Age: 43
End: 2023-12-28
Payer: COMMERCIAL

## 2023-12-28 ENCOUNTER — TELEPHONE (OUTPATIENT)
Dept: GASTROENTEROLOGY | Facility: CLINIC | Age: 43
End: 2023-12-28

## 2023-12-28 VITALS
DIASTOLIC BLOOD PRESSURE: 78 MMHG | HEIGHT: 74 IN | WEIGHT: 273 LBS | SYSTOLIC BLOOD PRESSURE: 118 MMHG | TEMPERATURE: 97.4 F | BODY MASS INDEX: 35.04 KG/M2

## 2023-12-28 DIAGNOSIS — Z12.11 COLON CANCER SCREENING: ICD-10-CM

## 2023-12-28 DIAGNOSIS — R14.0 BLOATING: ICD-10-CM

## 2023-12-28 DIAGNOSIS — R10.13 EPIGASTRIC PAIN: Primary | ICD-10-CM

## 2023-12-28 DIAGNOSIS — K42.9 UMBILICAL HERNIA WITHOUT OBSTRUCTION AND WITHOUT GANGRENE: ICD-10-CM

## 2023-12-28 DIAGNOSIS — K76.0 HEPATIC STEATOSIS: ICD-10-CM

## 2023-12-28 LAB — IGA SERPL-MCNC: 273 MG/DL (ref 66–433)

## 2023-12-28 PROCEDURE — 36415 COLL VENOUS BLD VENIPUNCTURE: CPT

## 2023-12-28 PROCEDURE — 86364 TISS TRNSGLTMNASE EA IG CLAS: CPT

## 2023-12-28 PROCEDURE — 99204 OFFICE O/P NEW MOD 45 MIN: CPT | Performed by: PHYSICIAN ASSISTANT

## 2023-12-28 PROCEDURE — 82784 ASSAY IGA/IGD/IGG/IGM EACH: CPT

## 2023-12-28 RX ORDER — OMEPRAZOLE 40 MG/1
40 CAPSULE, DELAYED RELEASE ORAL DAILY
Qty: 30 CAPSULE | Refills: 3 | Status: SHIPPED | OUTPATIENT
Start: 2023-12-28

## 2023-12-28 NOTE — PROGRESS NOTES
[facility] - Outpatient Consultation New Patient  Vance Meredith 43 y.o. male MRN: [id number]  Encounter: [id number]  ASSESSMENT AND PLAN:    1. Epigastric pain  2. Bloating  Patient with dull epigastric pain for 1 month with associated bloating.  His weight has been stable.  We reviewed his recent labs and CT scan results.  Patient expressed understanding to results.  All questions answered.    Will test stool for H. Pylori  Will check celiac panel  Discussed that after leaving the stool sample for H. pylori patient will begin taking omeprazole 40 mg once daily before breakfast  If abdominal pain persists despite adequate trial of PPI will plan to add on EGD to scheduled colonoscopy    - H. pylori antigen, stool; Future  - omeprazole (PriLOSEC) 40 MG capsule; Take 1 capsule (40 mg total) by mouth daily Before breakfast  Dispense: 30 capsule; Refill: 3  - Tissue transglutaminase, IgA; Future  - IgA; Future      3. Umbilical hernia without obstruction and without gangrene  Seen on CT scan.  Pending clinical course discussed with patient and his wife we can consider referral to general surgery for opinion on hernia repair.  It is unclear if this is a cause of his abdominal pain.    4. Hepatic steatosis  Seen on CT scan.  We reviewed his recent normal liver function tests.  I educated on fatty liver.  Recommended low fat healthy diet, alcohol cessation, and activity as tolerated to promote weight loss as treatment for fatty liver.     5. Colon cancer screening  Will schedule routine screening colonoscopy at this time.  He is average risk for colon cancer screening.  Patient may need an EGD added onto his colonoscopy pending clinical course and results.    - Colonoscopy; Future    Patient was instructed to call the office with any questions, concerns, new/ worsening/ persisting GI symptoms. Advised patient go to the ER with any severe or worsening abdominal pain, fevers/ chills, intractable  N/V, chest pain, SOB, dizziness, lightheadedness, feeling something stuck in esophagus that will not go down. Patient expressed understanding and is in agreement with treatment plan.       Will plan to follow up after diagnostic tests   ________________________________________________________    HPI:      Patient is new to me and our office.  Patient with a past medical history of obstructive sleep apnea, history of Lyme carditis, back pain, obesity presents to the office today with complaints of abdominal pain.    His wife is present for visit today.     Patient complains of abdominal pain x 1 month.  Abdominal pain is located in epigastric region.  Abdominal pain is described as dull.The pain comes and goes.   Was worse with bending over and reaching over for something at work. No radiation of pain.Pain may improve with laying down.    Pain is not related to meals or certain foods. He denies associated change in bowels.   He has formed brown BM daily. He denies nocturnal stools.   He does admit to occasional abdominal bloating as well    Patient denies any associated fevers/ chills, unintentional weight loss, decreased appetite, nausea, vomiting, change in bowel habits, diarrhea, constipation, black or bloody stools, heartburn, regurgitation, acid reflux, dysphagia, odynophagia.   Denies chest pain, SOB    Tobacco use- None  Alcohol use- None  NSAID use- Occasional prn back pain   He denies changes in diet medication or travel prior to symptom onset   He denies abdominal surgeries in the past     No prior EGD or colonsocopy   He denies FH of CRC or colon polyps   Sister with Crohn's disease     REVIEW OF SYSTEMS:    Review of Systems   Constitutional:  Negative for chills and fever.   HENT:  Negative for ear pain and sore throat.    Eyes:  Negative for pain and visual disturbance.   Respiratory:  Negative for cough and shortness of breath.    Cardiovascular:  Negative for chest pain and palpitations.    Gastrointestinal:  Positive for abdominal pain. Negative for constipation, diarrhea, nausea and vomiting.   Genitourinary:  Negative for dysuria, frequency and hematuria.   Musculoskeletal:  Negative for arthralgias, back pain and myalgias.   Skin:  Negative for color change and rash.   Neurological:  Negative for seizures, syncope and headaches.   All other systems reviewed and are negative.       Historical Information   Past Medical History:   Diagnosis Date    Arthritis     Back pain     Cancer (HCC)     skin cancer on face    DDD (degenerative disc disease), lumbar     Foot pain, left     History of herniated intervertebral disc     x 6    Obesity     Scratches     Hands    Seasonal allergies     Snores      Past Surgical History:   Procedure Laterality Date    ID ENDOSCOPIC PLANTAR FASCIOTOMY Left 12/26/2018    Procedure: RELEASE FASCIA PLANTAR/FASCIOTOMY ENDOSCOPIC (EPF);  Surgeon: Ilir Brown DPM;  Location: [facility];  Service: Podiatry    SEPTOPLASTY      SKIN CANCER EXCISION      Facial- by nose    VASECTOMY       Social History   Social History     Substance and Sexual Activity   Alcohol Use Yes    Comment: Rarely- 3 monthly     Social History     Substance and Sexual Activity   Drug Use No     Social History     Tobacco Use   Smoking Status Former    Types: Cigarettes   Smokeless Tobacco Never     History reviewed. No pertinent family history.    Meds/Allergies       Current Outpatient Medications:     ibuprofen (MOTRIN) 600 mg tablet    acetaminophen (TYLENOL) 500 mg tablet    Allergies   Allergen Reactions    Penicillins Rash     Childhood           Objective   Wt Readings from Last 3 Encounters:   12/28/23 124 kg (273 lb)   01/24/23 120 kg (265 lb)   07/29/19 121 kg (267 lb 6.7 oz)     Temp Readings from Last 3 Encounters:   12/28/23 (!) 97.4 °F (36.3 °C) (Tympanic)   08/01/19 97.7 °F (36.5 °C) (Oral)   07/29/19 98.3 °F (36.8 °C) (Oral)     BP Readings from Last 3 Encounters:   12/28/23 118/78    08/01/19 147/81   07/29/19 120/72     Pulse Readings from Last 3 Encounters:   08/01/19 79   07/29/19 59   12/26/18 72        PHYSICAL EXAM:     Physical Exam  Vitals and nursing note reviewed.   Constitutional:       General: He is not in acute distress.     Appearance: He is well-developed.   HENT:      Head: Normocephalic and atraumatic.   Eyes:      Conjunctiva/sclera: Conjunctivae normal.   Cardiovascular:      Rate and Rhythm: Normal rate and regular rhythm.      Heart sounds: No murmur heard.  Pulmonary:      Effort: Pulmonary effort is normal. No respiratory distress.      Breath sounds: Normal breath sounds.   Abdominal:      Palpations: Abdomen is soft.      Tenderness: There is no abdominal tenderness.   Musculoskeletal:         General: No swelling.      Cervical back: Neck supple.   Skin:     General: Skin is warm and dry.      Capillary Refill: Capillary refill takes less than 2 seconds.   Neurological:      Mental Status: He is alert.   Psychiatric:         Mood and Affect: Mood normal.         Lab Results:   Lab Results   Component Value Date    WBC 4.64 12/22/2023    HGB 15.1 12/22/2023    HCT 44.1 12/22/2023    MCV 90 12/22/2023     12/22/2023       Lab Results   Component Value Date    SODIUM 137 12/22/2023    K 4.2 12/22/2023     12/22/2023    CO2 29 12/22/2023    AGAP 5 12/22/2023    BUN 27 (H) 12/22/2023    CREATININE 1.08 12/22/2023    GLUC 101 07/31/2019    GLUF 93 12/22/2023    CALCIUM 8.9 12/22/2023    AST 21 12/22/2023    ALT 24 12/22/2023    ALKPHOS 53 12/22/2023    TP 7.3 12/22/2023    TBILI 0.53 12/22/2023    EGFR 82 12/22/2023     Lab Results   Component Value Date    LIPASE 11 12/22/2023       Radiology Results:   CT abdomen w contrast    Result Date: 12/22/2023  Narrative: CT ABDOMEN WITH IV CONTRAST INDICATION:   R10.13: Epigastric pain. COMPARISON: None. TECHNIQUE:  CT examination of the abdomen was performed after the administration of intravenous contrast.  Multiplanar 2D reformatted images were created from the source data. This examination, like all CT scans performed in the [facility], was performed utilizing techniques to minimize radiation dose exposure, including the use of iterative reconstruction and automated exposure control.  Radiation dose length  product (DLP) for this visit:  718.77 mGy-cm IV Contrast:  100 mL of iohexol (OMNIPAQUE) Enteric Contrast:  Enteric contrast was not administered. FINDINGS: LOWER CHEST:  No clinically significant abnormality identified in the visualized lower chest. Retrocrural lymph node seen measuring about 6 mm LIVER/BILIARY TREE: Hepatic steatosis GALLBLADDER:  No calcified gallstones. No pericholecystic inflammatory change. SPLEEN:  Unremarkable. PANCREAS:  Unremarkable. ADRENAL GLANDS: 9 mm right adrenal nodule seen KIDNEYS/URETERS:  Unremarkable. Mild prominence of the right renal pelvis VISUALIZED STOMACH AND BOWEL:  Unremarkable. Submucosal hypodensity in the terminal ileum due to sequela of chronic inflammation or due to metabolic syndrome ABDOMINAL CAVITY:  No ascites.  No pneumoperitoneum. No lymphadenopathy. Small mesenteric lymph nodes measuring about 6 to 7 mm VESSELS:  Unremarkable for patient's age. ABDOMINAL WALL: Small umbilical hernia containing fat OSSEOUS STRUCTURES:  No acute fracture or destructive osseous lesion.     Impression: No acute inflammatory stranding Hepatic steatosis Mild prominence of the right renal pelvis and mild prominence of the mid right ureter, incompletely assessed correlate with urinary evaluation, if concern for ureteric calculi CT of the pelvis can be performed Small 6 to 7 mm mesenteric lymph nodes and a small 6 mm retrocrural lymph node, nonspecific do not meet the criteria for pathologic enlargement on the basis of size No bowel obstruction Workstation performed: [id number]     Dior Green PA-C   Available on [employer]

## 2023-12-28 NOTE — PATIENT INSTRUCTIONS
Scheduled date of colonoscopy (as of today):  03/04/2024  Physician performing colonoscopy: Dr. Del Rosario  Location of colonoscopy: [facility]  Bowel prep reviewed with patient: Miralax  Instructions reviewed with patient by: Elvia CURTIS   Clearances:   N/A

## 2023-12-29 LAB — TTG IGA SER-ACNC: <2 U/ML (ref 0–3)

## 2023-12-30 ENCOUNTER — APPOINTMENT (OUTPATIENT)
Dept: LAB | Facility: CLINIC | Age: 43
End: 2023-12-30
Payer: COMMERCIAL

## 2023-12-30 DIAGNOSIS — R14.0 BLOATING: ICD-10-CM

## 2023-12-30 DIAGNOSIS — R10.13 EPIGASTRIC PAIN: ICD-10-CM

## 2023-12-30 PROCEDURE — 87338 HPYLORI STOOL AG IA: CPT

## 2024-01-01 LAB — H PYLORI AG STL QL IA: NEGATIVE

## 2024-02-06 ENCOUNTER — APPOINTMENT (OUTPATIENT)
Dept: LAB | Facility: AMBULARY SURGERY CENTER | Age: 44
End: 2024-02-06
Payer: COMMERCIAL

## 2024-02-06 DIAGNOSIS — D35.02 BENIGN NEOPLASM OF LEFT ADRENAL GLAND: ICD-10-CM

## 2024-02-06 LAB
CHOLEST SERPL-MCNC: 202 MG/DL
CORTIS AM PEAK SERPL-MCNC: 0.6 UG/DL (ref 6.7–22.6)
HDLC SERPL-MCNC: 51 MG/DL
LDLC SERPL CALC-MCNC: 103 MG/DL (ref 0–100)
NONHDLC SERPL-MCNC: 151 MG/DL
TRIGL SERPL-MCNC: 240 MG/DL
TSH SERPL DL<=0.05 MIU/L-ACNC: 1.04 UIU/ML (ref 0.45–4.5)

## 2024-02-06 PROCEDURE — 80061 LIPID PANEL: CPT

## 2024-02-06 PROCEDURE — 36415 COLL VENOUS BLD VENIPUNCTURE: CPT

## 2024-02-06 PROCEDURE — 82533 TOTAL CORTISOL: CPT

## 2024-02-06 PROCEDURE — 84443 ASSAY THYROID STIM HORMONE: CPT

## 2024-02-06 PROCEDURE — 83835 ASSAY OF METANEPHRINES: CPT

## 2024-02-19 ENCOUNTER — ANESTHESIA (OUTPATIENT)
Dept: ANESTHESIOLOGY | Facility: HOSPITAL | Age: 44
End: 2024-02-19

## 2024-02-19 ENCOUNTER — ANESTHESIA EVENT (OUTPATIENT)
Dept: ANESTHESIOLOGY | Facility: HOSPITAL | Age: 44
End: 2024-02-19

## 2024-02-21 ENCOUNTER — TELEPHONE (OUTPATIENT)
Dept: GASTROENTEROLOGY | Facility: CLINIC | Age: 44
End: 2024-02-21

## 2024-02-22 LAB
METANEPH FREE SERPL-MCNC: <25 PG/ML (ref 0–88)
NORMETANEPHRINE SERPL-MCNC: 46.6 PG/ML (ref 0–218.9)

## 2024-02-28 ENCOUNTER — APPOINTMENT (OUTPATIENT)
Dept: LAB | Facility: AMBULARY SURGERY CENTER | Age: 44
End: 2024-02-28
Payer: COMMERCIAL

## 2024-02-28 DIAGNOSIS — Z01.84 IMMUNITY TO VARICELLA DETERMINED BY SEROLOGIC TEST: ICD-10-CM

## 2024-02-28 DIAGNOSIS — Z11.59 NEED FOR HEPATITIS B SCREENING TEST: ICD-10-CM

## 2024-02-28 DIAGNOSIS — Z11.1 SCREENING-PULMONARY TB: ICD-10-CM

## 2024-02-28 DIAGNOSIS — Z01.84 IMMUNITY TO MEASLES, MUMPS, AND RUBELLA DETERMINED BY SEROLOGIC TEST: ICD-10-CM

## 2024-02-28 LAB
MEV IGG SER QL IA: NORMAL
MUV IGG SER QL IA: NORMAL
RUBV IGG SERPL IA-ACNC: 16.2 IU/ML
VZV IGG SER QL IA: NORMAL

## 2024-02-28 PROCEDURE — 36415 COLL VENOUS BLD VENIPUNCTURE: CPT

## 2024-02-28 PROCEDURE — 86707 HEPATITIS BE ANTIBODY: CPT

## 2024-02-28 PROCEDURE — 86480 TB TEST CELL IMMUN MEASURE: CPT

## 2024-02-28 PROCEDURE — 86762 RUBELLA ANTIBODY: CPT

## 2024-02-28 PROCEDURE — 86735 MUMPS ANTIBODY: CPT

## 2024-02-28 PROCEDURE — 86787 VARICELLA-ZOSTER ANTIBODY: CPT

## 2024-02-28 PROCEDURE — 86765 RUBEOLA ANTIBODY: CPT

## 2024-02-29 LAB
GAMMA INTERFERON BACKGROUND BLD IA-ACNC: 0.08 IU/ML
HBV E AB SERPL QL IA: NORMAL
M TB IFN-G BLD-IMP: NEGATIVE
M TB IFN-G CD4+ BCKGRND COR BLD-ACNC: 0.01 IU/ML
M TB IFN-G CD4+ BCKGRND COR BLD-ACNC: 0.11 IU/ML
MITOGEN IGNF BCKGRD COR BLD-ACNC: 5.35 IU/ML

## 2024-03-04 ENCOUNTER — ANESTHESIA (OUTPATIENT)
Dept: GASTROENTEROLOGY | Facility: AMBULARY SURGERY CENTER | Age: 44
End: 2024-03-04

## 2024-03-04 ENCOUNTER — ANESTHESIA EVENT (OUTPATIENT)
Dept: GASTROENTEROLOGY | Facility: AMBULARY SURGERY CENTER | Age: 44
End: 2024-03-04

## 2024-03-04 ENCOUNTER — HOSPITAL ENCOUNTER (OUTPATIENT)
Dept: GASTROENTEROLOGY | Facility: AMBULARY SURGERY CENTER | Age: 44
Setting detail: OUTPATIENT SURGERY
Discharge: HOME | End: 2024-03-04
Payer: COMMERCIAL

## 2024-03-04 VITALS
BODY MASS INDEX: 34.01 KG/M2 | HEIGHT: 74 IN | TEMPERATURE: 97.5 F | DIASTOLIC BLOOD PRESSURE: 75 MMHG | HEART RATE: 82 BPM | SYSTOLIC BLOOD PRESSURE: 145 MMHG | RESPIRATION RATE: 19 BRPM | WEIGHT: 265 LBS | OXYGEN SATURATION: 98 %

## 2024-03-04 DIAGNOSIS — R10.13 EPIGASTRIC PAIN: ICD-10-CM

## 2024-03-04 DIAGNOSIS — Z12.11 COLON CANCER SCREENING: ICD-10-CM

## 2024-03-04 PROCEDURE — 45385 COLONOSCOPY W/LESION REMOVAL: CPT | Performed by: INTERNAL MEDICINE

## 2024-03-04 PROCEDURE — 88305 TISSUE EXAM BY PATHOLOGIST: CPT | Performed by: PATHOLOGY

## 2024-03-04 PROCEDURE — 43239 EGD BIOPSY SINGLE/MULTIPLE: CPT | Performed by: INTERNAL MEDICINE

## 2024-03-04 RX ORDER — LIDOCAINE HYDROCHLORIDE 10 MG/ML
INJECTION, SOLUTION EPIDURAL; INFILTRATION; INTRACAUDAL; PERINEURAL AS NEEDED
Status: DISCONTINUED | OUTPATIENT
Start: 2024-03-04 | End: 2024-03-04

## 2024-03-04 RX ORDER — PROPOFOL 10 MG/ML
INJECTION, EMULSION INTRAVENOUS AS NEEDED
Status: DISCONTINUED | OUTPATIENT
Start: 2024-03-04 | End: 2024-03-04

## 2024-03-04 RX ORDER — SODIUM CHLORIDE, SODIUM LACTATE, POTASSIUM CHLORIDE, CALCIUM CHLORIDE 600; 310; 30; 20 MG/100ML; MG/100ML; MG/100ML; MG/100ML
INJECTION, SOLUTION INTRAVENOUS CONTINUOUS PRN
Status: DISCONTINUED | OUTPATIENT
Start: 2024-03-04 | End: 2024-03-04

## 2024-03-04 RX ADMIN — PROPOFOL 40 MG: 10 INJECTION, EMULSION INTRAVENOUS at 12:32

## 2024-03-04 RX ADMIN — SODIUM CHLORIDE, SODIUM LACTATE, POTASSIUM CHLORIDE, AND CALCIUM CHLORIDE: .6; .31; .03; .02 INJECTION, SOLUTION INTRAVENOUS at 12:13

## 2024-03-04 RX ADMIN — PROPOFOL 50 MG: 10 INJECTION, EMULSION INTRAVENOUS at 12:38

## 2024-03-04 RX ADMIN — PROPOFOL 30 MG: 10 INJECTION, EMULSION INTRAVENOUS at 12:22

## 2024-03-04 RX ADMIN — PROPOFOL 50 MG: 10 INJECTION, EMULSION INTRAVENOUS at 12:30

## 2024-03-04 RX ADMIN — PROPOFOL 30 MG: 10 INJECTION, EMULSION INTRAVENOUS at 12:18

## 2024-03-04 RX ADMIN — PROPOFOL 200 MG: 10 INJECTION, EMULSION INTRAVENOUS at 12:16

## 2024-03-04 RX ADMIN — PROPOFOL 50 MG: 10 INJECTION, EMULSION INTRAVENOUS at 12:27

## 2024-03-04 RX ADMIN — PROPOFOL 40 MG: 10 INJECTION, EMULSION INTRAVENOUS at 12:20

## 2024-03-04 RX ADMIN — LIDOCAINE HYDROCHLORIDE 50 MG: 10 INJECTION, SOLUTION EPIDURAL; INFILTRATION; INTRACAUDAL; PERINEURAL at 12:16

## 2024-03-04 RX ADMIN — PROPOFOL 30 MG: 10 INJECTION, EMULSION INTRAVENOUS at 12:36

## 2024-03-04 RX ADMIN — PROPOFOL 50 MG: 10 INJECTION, EMULSION INTRAVENOUS at 12:35

## 2024-03-04 NOTE — H&P
"History and Physical -  [facility]  Vance Meredith 43 y.o. male MRN: [id number]    HPI: Vance Meredith is a 43 y.o. year old male w bloating, epigastric pain, hepatic steatosis presenting for EGD for sx evaluation and colonoscopy for CRC screening.     Last Hg   Lab Results   Component Value Date    HGB 15.1 12/22/2023     Last INR   Lab Results   Component Value Date    INR 1.17 07/28/2019       Review of Systems    Historical Information   Past Medical History:   Diagnosis Date    Arthritis     Back pain     Cancer (HCC)     skin cancer on face    DDD (degenerative disc disease), lumbar     Foot pain, left     History of herniated intervertebral disc     x 6    Obesity     Scratches     Hands    Seasonal allergies     Snores      Past Surgical History:   Procedure Laterality Date    ID ENDOSCOPIC PLANTAR FASCIOTOMY Left 12/26/2018    Procedure: RELEASE FASCIA PLANTAR/FASCIOTOMY ENDOSCOPIC (EPF);  Surgeon: Ilir Brown DPM;  Location: [facility];  Service: Podiatry    SEPTOPLASTY      SKIN CANCER EXCISION      Facial- by nose    VASECTOMY       Social History   Social History     Substance and Sexual Activity   Alcohol Use Yes    Comment: Rarely- 3 monthly     Social History     Substance and Sexual Activity   Drug Use No     Social History     Tobacco Use   Smoking Status Former    Types: Cigarettes   Smokeless Tobacco Never     History reviewed. No pertinent family history.    Meds/Allergies     (Not in a hospital admission)      Allergies   Allergen Reactions    Penicillins Rash     Childhood       Objective     /76   Pulse 79   Temp (!) 97.3 °F (36.3 °C) (Temporal)   Resp 16   Ht 6' 2\" (1.88 m)   Wt 120 kg (265 lb)   SpO2 97%   BMI 34.02 kg/m²     PHYSICAL EXAM    Gen: NAD  CV: RRR  CHEST: Clear  ABD: soft, NT/ND  EXT: no edema  Neuro: AAO    ASSESSMENT/PLAN:  This is a 43 y.o. year old male here for EGD, colonoscopy    Plan/Procedure: EGD, colonoscopy      "

## 2024-03-04 NOTE — ANESTHESIA PREPROCEDURE EVALUATION
Procedure:  COLONOSCOPY    Relevant Problems   CARDIO   (+) First degree AV block      MUSCULOSKELETAL   (+) Chronic midline low back pain without sciatica      NEURO/PSYCH   (+) Chronic midline low back pain without sciatica      PULMONARY   (+) Obstructive sleep apnea (adult) (pediatric)        Physical Exam    Airway    Mallampati score: II  TM Distance: >3 FB  Neck ROM: full     Dental       Cardiovascular      Pulmonary      Other Findings        Anesthesia Plan  ASA Score- 2     Anesthesia Type- IV sedation with anesthesia with ASA Monitors.         Additional Monitors:     Airway Plan:            Plan Factors-    Chart reviewed.                      Induction- intravenous.    Postoperative Plan-     Informed Consent- Anesthetic plan and risks discussed with patient.  I personally reviewed this patient with the CRNA. Discussed and agreed on the Anesthesia Plan with the CRNA..

## 2024-03-04 NOTE — ANESTHESIA POSTPROCEDURE EVALUATION
Post-Op Assessment Note    CV Status:  Stable  Pain Score: 0    Pain management: adequate       Mental Status:  Alert and awake   Hydration Status:  Euvolemic and stable   PONV Controlled:  Controlled   Airway Patency:  Patent and adequate     Post Op Vitals Reviewed: Yes    No anethesia notable event occurred.    Staff: CRNA               BP  121/71    Temp      Pulse 63   Resp 16   SpO2 98% RA

## 2024-03-05 PROCEDURE — 88305 TISSUE EXAM BY PATHOLOGIST: CPT | Performed by: PATHOLOGY

## (undated) DEVICE — SYRINGE 20ML LL

## (undated) DEVICE — CHLORAPREP HI-LITE 26ML ORANGE

## (undated) DEVICE — SYRINGE 3ML LL

## (undated) DEVICE — GLOVE SRG BIOGEL 7

## (undated) DEVICE — CUFF TOURNIQUET 18 X 4 IN QUICK CONNECT DISP 1 BLADDER

## (undated) DEVICE — BETHLEHEM UNIVERSAL  MIONR EXT: Brand: CARDINAL HEALTH

## (undated) DEVICE — 2000CC GUARDIAN II: Brand: GUARDIAN

## (undated) DEVICE — GLOVE SRG BIOGEL 6.5

## (undated) DEVICE — CAST PADDING 4 IN SYNTHETIC NON-STRL

## (undated) DEVICE — KERLIX BANDAGE ROLL: Brand: KERLIX

## (undated) DEVICE — GLOVE INDICATOR PI UNDERGLOVE SZ 6.5 BLUE

## (undated) DEVICE — GLOVE INDICATOR PI UNDERGLOVE SZ 7 BLUE

## (undated) DEVICE — NEEDLE 18 G X 1 1/2

## (undated) DEVICE — SYRINGE 10ML LL

## (undated) DEVICE — URETERAL CATHETER ADAPTOR TIP

## (undated) DEVICE — NEEDLE 25G X 1 1/2

## (undated) DEVICE — STOCKINETTE REGULAR

## (undated) DEVICE — BLADE ENDO TRAC

## (undated) DEVICE — STRL COTTON TIP APPLCTR 6IN PK: Brand: CARDINAL HEALTH

## (undated) DEVICE — SUPER SPONGES,MEDIUM: Brand: KERLIX

## (undated) DEVICE — OCCLUSIVE GAUZE STRIP,3% BISMUTH TRIBROMOPHENATE IN PETROLATUM BLEND: Brand: XEROFORM

## (undated) DEVICE — INTENDED FOR TISSUE SEPARATION, AND OTHER PROCEDURES THAT REQUIRE A SHARP SURGICAL BLADE TO PUNCTURE OR CUT.: Brand: BARD-PARKER ® CARBON RIB-BACK BLADES

## (undated) DEVICE — ACE WRAP 4 IN UNSTERILE

## (undated) DEVICE — SUT ETHILON 4-0 PS-2 18 IN 1667H